# Patient Record
Sex: FEMALE | Race: BLACK OR AFRICAN AMERICAN | NOT HISPANIC OR LATINO | Employment: FULL TIME | URBAN - METROPOLITAN AREA
[De-identification: names, ages, dates, MRNs, and addresses within clinical notes are randomized per-mention and may not be internally consistent; named-entity substitution may affect disease eponyms.]

---

## 2024-01-02 ENCOUNTER — PATIENT MESSAGE (OUTPATIENT)
Dept: INTERNAL MEDICINE | Facility: CLINIC | Age: 49
End: 2024-01-02

## 2024-01-02 ENCOUNTER — TELEPHONE (OUTPATIENT)
Dept: INTERNAL MEDICINE | Facility: CLINIC | Age: 49
End: 2024-01-02

## 2024-01-02 DIAGNOSIS — R73.03 PREDIABETES: ICD-10-CM

## 2024-01-02 NOTE — TELEPHONE ENCOUNTER
Caller: Stacia Gomez    Relationship: Self    Best call back number: [phone]  Requested Prescriptions:   Requested Prescriptions     Pending Prescriptions Disp Refills    Tirzepatide (Mounjaro) 7.5 MG/0.5ML solution pen-injector pen 3 mL 1     Sig: Inject 0.5 mL under the skin into the appropriate area as directed 1 (One) Time Per Week.        Pharmacy where request should be sent: [id number] [address] AT [facility] [address]  - [phone]  - [phone] FX     Last office visit with prescribing clinician: 12/1/2023   Last telemedicine visit with prescribing clinician: Visit date not found   Next office visit with prescribing clinician: 1/23/2024     Additional details provided by patient: PT IS OUT BUT WANTS TO GO BACK DOWN TO THE 5MG INSTEAD OF STAYING AT THE 7.5. PT HAS HAD TO CANCEL APPT TODAY DUE TO PROVIDER BEING OUT AND HAS AN UPCOMING APPT ON 1/23/24 @1PM.     Does the patient have less than a 3 day supply:  [x] Yes  [] No    Would you like a call back once the refill request has been completed: [x] Yes [] No    If the office needs to give you a call back, can they leave a voicemail: [] Yes [] No    Shan Lyles Rep   01/02/24 13:07 EST

## 2024-01-02 NOTE — TELEPHONE ENCOUNTER
Can you please fill since Dr. Pinzon is out?     Last office visit with prescribing clinician: 12/1/2023   Last telemedicine visit with prescribing clinician: Visit date not found   Next office visit with prescribing clinician: 1/23/2024

## 2024-01-03 DIAGNOSIS — R73.03 PREDIABETES: Primary | ICD-10-CM

## 2024-01-03 NOTE — TELEPHONE ENCOUNTER
From: Stacia Gomez  To: Marce Pinzon  Sent: 1/2/2024 9:55 PM EST  Subject: Mounjaro Refill     I was scheduled for an appt with Dr. Pinzon today which was cancelled due to her being out sick.   My appt was rescheduled for 01/23, but my appt today was for my monthly check with Mounjaro and to get my RX called in to the pharmacy. My request was to go back down to the 5mg and I checked with the pharmacy and see the RX was called in for the 7.5mg.   I am requesting for the correct dosage for 5mg to be called in. Please let me know if you have questions.   Thanks

## 2024-01-04 ENCOUNTER — TELEPHONE (OUTPATIENT)
Dept: INTERNAL MEDICINE | Facility: CLINIC | Age: 49
End: 2024-01-04
Payer: COMMERCIAL

## 2024-01-04 ENCOUNTER — PRIOR AUTHORIZATION (OUTPATIENT)
Dept: INTERNAL MEDICINE | Facility: CLINIC | Age: 49
End: 2024-01-04
Payer: COMMERCIAL

## 2024-01-04 NOTE — TELEPHONE ENCOUNTER
----- Message from Stacia Gomez sent at 1/4/2024 12:24 PM EST -----  Regarding: Mounjaro Refill   Contact: [phone]  In addition, I received a call from [facility] stating the RX for Mounjaro needs a PA. The pharmacist said they sent the PA info to your office for further info since a PA wasn’t previously needed for the medicine.

## 2024-01-05 NOTE — TELEPHONE ENCOUNTER
PA has been denied.     Denial because:  Your plan only covers this drug when it is used for certain health conditions. Covered use is for type 2  diabetes. Your plan does not cover this drug for your health condition that your doctor told us you  have. We reviewed the information we had. Your request has been denied.

## 2024-01-10 DIAGNOSIS — E66.01 CLASS 2 SEVERE OBESITY WITH SERIOUS COMORBIDITY IN ADULT, UNSPECIFIED BMI, UNSPECIFIED OBESITY TYPE: Primary | ICD-10-CM

## 2024-01-10 RX ORDER — SEMAGLUTIDE 1 MG/.5ML
1 INJECTION, SOLUTION SUBCUTANEOUS WEEKLY
Qty: 3 ML | Refills: 1 | Status: SHIPPED | OUTPATIENT
Start: 2024-01-10 | End: 2024-01-12 | Stop reason: SDUPTHER

## 2024-01-12 ENCOUNTER — OFFICE VISIT (OUTPATIENT)
Dept: INTERNAL MEDICINE | Facility: CLINIC | Age: 49
End: 2024-01-12
Payer: COMMERCIAL

## 2024-01-12 ENCOUNTER — LAB (OUTPATIENT)
Dept: INTERNAL MEDICINE | Facility: CLINIC | Age: 49
End: 2024-01-12
Payer: COMMERCIAL

## 2024-01-12 VITALS
RESPIRATION RATE: 18 BRPM | WEIGHT: 176.8 LBS | SYSTOLIC BLOOD PRESSURE: 124 MMHG | HEART RATE: 67 BPM | BODY MASS INDEX: 32.54 KG/M2 | TEMPERATURE: 98.2 F | OXYGEN SATURATION: 100 % | HEIGHT: 62 IN | DIASTOLIC BLOOD PRESSURE: 80 MMHG

## 2024-01-12 DIAGNOSIS — R73.03 PREDIABETES: Primary | ICD-10-CM

## 2024-01-12 DIAGNOSIS — E66.01 CLASS 2 SEVERE OBESITY WITH SERIOUS COMORBIDITY IN ADULT, UNSPECIFIED BMI, UNSPECIFIED OBESITY TYPE: ICD-10-CM

## 2024-01-12 DIAGNOSIS — R91.1 LUNG NODULE: ICD-10-CM

## 2024-01-12 DIAGNOSIS — N83.209 CYST OF OVARY, UNSPECIFIED LATERALITY: ICD-10-CM

## 2024-01-12 DIAGNOSIS — M25.50 ARTHRALGIA, UNSPECIFIED JOINT: ICD-10-CM

## 2024-01-12 LAB
CRP SERPL-MCNC: 3.69 MG/DL (ref 0–0.5)
ERYTHROCYTE [SEDIMENTATION RATE] IN BLOOD: 45 MM/HR (ref 0–20)
EXPIRATION DATE: NORMAL
HBA1C MFR BLD: 5.2 % (ref 4.5–5.7)
Lab: NORMAL

## 2024-01-12 PROCEDURE — 86140 C-REACTIVE PROTEIN: CPT | Performed by: FAMILY MEDICINE

## 2024-01-12 PROCEDURE — 85652 RBC SED RATE AUTOMATED: CPT | Performed by: FAMILY MEDICINE

## 2024-01-12 RX ORDER — SEMAGLUTIDE 1 MG/.5ML
1 INJECTION, SOLUTION SUBCUTANEOUS WEEKLY
Qty: 3 ML | Refills: 1 | Status: SHIPPED | OUTPATIENT
Start: 2024-01-12

## 2024-01-12 NOTE — PROGRESS NOTES
"    Office Note     Name: Stacia Gomez    : 1975     MRN: [id number]     Chief Complaint  Follow-up (Follow up on prediabetes, Patient also says she has some results that came back for a CT scan on her chest that she would like to go over as well. )    Subjective     History of Present Illness:  Stacia Gomez is a 48 y.o. female who presents today for several concerns.  She states that her insurance will no longer pay for Monjouro and she would like to change to wegovy and has had trouble finding it at a pharmacy and was continuing to lose wt on the other medicine and her bs was going down also.  She went to the ER with abdominal pain last month and was found to have cysts on her ovaries and she is having surgery later this month to possibly have an ovary removed or the cyst removed and she was also found to have stable lung nodules.  She wants to discuss this.    Review of Systems   Respiratory:  Negative for cough, shortness of breath and wheezing.    Cardiovascular:  Negative for chest pain and palpitations.   Gastrointestinal:  Negative for blood in stool, diarrhea and vomiting.   Genitourinary:  Negative for dysuria, flank pain and genital sores.       Objective     Vital Signs  /80   Pulse 67   Temp 98.2 °F (36.8 °C) (Temporal)   Resp 18   Ht 157.5 cm (62.01\")   Wt 80.2 kg (176 lb 12.8 oz)   SpO2 100%   BMI 32.33 kg/m²   Estimated body mass index is 32.33 kg/m² as calculated from the following:    Height as of this encounter: 157.5 cm (62.01\").    Weight as of this encounter: 80.2 kg (176 lb 12.8 oz).            Physical Exam  Vitals and nursing note reviewed.   HENT:      Head: Normocephalic and atraumatic.   Neck:      Vascular: No carotid bruit.   Cardiovascular:      Rate and Rhythm: Normal rate and regular rhythm.      Heart sounds: Normal heart sounds. No murmur heard.     No gallop.   Pulmonary:      Effort: Pulmonary effort is normal. No respiratory distress.      Breath sounds: No " stridor. No wheezing, rhonchi or rales.   Musculoskeletal:      Cervical back: Normal range of motion and neck supple.      Right lower leg: No edema.      Left lower leg: No edema.   Lymphadenopathy:      Cervical: No cervical adenopathy.   Neurological:      Mental Status: She is alert.                 Assessment and Plan     Diagnoses and all orders for this visit:    1. Prediabetes (Primary)  -     POC Glycosylated Hemoglobin (Hb A1C)    2. Class 2 severe obesity with serious comorbidity in adult, unspecified BMI, unspecified obesity type--stop monjouro and start wegovy and rtc in 2 months  -     Semaglutide-Weight Management (Wegovy) 1 MG/0.5ML solution auto-injector; Inject 0.5 mL under the skin into the appropriate area as directed 1 (One) Time Per Week.  Dispense: 3 mL; Refill: 1  3.  Ovarian cysts planning surgery later this month    4.  Lung nodules-stable        Follow Up  2 months    Marce Pinzon DO

## 2024-01-15 ENCOUNTER — TELEPHONE (OUTPATIENT)
Dept: INTERNAL MEDICINE | Facility: CLINIC | Age: 49
End: 2024-01-15
Payer: COMMERCIAL

## 2024-01-15 NOTE — TELEPHONE ENCOUNTER
Spoke with patient about labs, she States she does have a rheumatologist, and she will call them to set up appt.   She verbalized understanding with no further questions

## 2024-01-15 NOTE — TELEPHONE ENCOUNTER
----- Message from Marce Pinzon DO sent at 1/15/2024  1:18 PM EST -----  Her inflammatory markers are elevated----does she have a rheumatologist that she still sees?

## 2024-02-06 ENCOUNTER — PRIOR AUTHORIZATION (OUTPATIENT)
Dept: INTERNAL MEDICINE | Facility: CLINIC | Age: 49
End: 2024-02-06
Payer: COMMERCIAL

## 2024-02-20 ENCOUNTER — TELEMEDICINE (OUTPATIENT)
Dept: INTERNAL MEDICINE | Facility: CLINIC | Age: 49
End: 2024-02-20
Payer: COMMERCIAL

## 2024-02-20 DIAGNOSIS — K58.0 IRRITABLE BOWEL SYNDROME WITH DIARRHEA: ICD-10-CM

## 2024-02-20 DIAGNOSIS — I10 PRIMARY HYPERTENSION: ICD-10-CM

## 2024-02-20 DIAGNOSIS — M79.7 FIBROMYALGIA: ICD-10-CM

## 2024-02-20 DIAGNOSIS — R73.03 PREDIABETES: ICD-10-CM

## 2024-02-20 DIAGNOSIS — E66.9 CLASS 1 OBESITY WITH SERIOUS COMORBIDITY AND BODY MASS INDEX (BMI) OF 31.0 TO 31.9 IN ADULT, UNSPECIFIED OBESITY TYPE: Primary | ICD-10-CM

## 2024-02-20 PROCEDURE — 99213 OFFICE O/P EST LOW 20 MIN: CPT | Performed by: FAMILY MEDICINE

## 2024-02-20 RX ORDER — SEMAGLUTIDE 1.7 MG/.75ML
0.75 INJECTION, SOLUTION SUBCUTANEOUS WEEKLY
Qty: 3 ML | Refills: 2 | Status: SHIPPED | OUTPATIENT
Start: 2024-02-20

## 2024-02-20 NOTE — PROGRESS NOTES
Telehealth Visit     Date: 2024   Patient Name: Stacia Gomez  : 1975   MRN: [id number]     Chief Complaint:  No chief complaint on file.      This provider is located at the [facility] in [place], [place]. The patient is being seen remotely via telehealth at their home address in [place], and stated they are in a secure environment for this session. The patient's condition being diagnosed/treated is appropriate for telemedicine. The provider identified herself as well as her credentials. The patient, and/or patients guardian, consent to be seen remotely, and when consent is given they understand that the consent allows for patient identifiable information to be sent to a third party as needed. They may refuse to be seen remotely at any time. The electronic data is encrypted and password protected, and the patient and/or guardian has been advised of the potential risks to privacy not withstanding such measures.    You have chosen to receive care through a telehealth visit. Do you consent to use a video/audio connection for your medical care today? Yes    History of Present Illness: Stacia Gomez is a 48 y.o. female who is here today to follow up with several concerns.  She has been using Wegovy for weight loss.  She also recently had surgery and they removed her fallopian tubes because they were enlarged and inflamed and they removed a large cyst from one of her ovaries.  She has been recovering from the surgery and doing well.  Her last BMI was 32.33 and her weight was 176 pounds.  Today her weight is 170 pounds and her BMI has gone down to 31 and she has been successful with her weight loss with the medication Wegovy.  We are going to go up to the next dose of Wegovy at this point because she is tolerating it well and doing well and being successful with it.  Her other chronic issues are stable.      Subjective      Review of Systems:   Review of Systems   HENT:   Negative for ear discharge, ear pain and facial swelling.    Respiratory:  Negative for cough, choking and chest tightness.    Cardiovascular:  Negative for chest pain, palpitations and leg swelling.       I have reviewed and the following portions of the patient's history were updated as appropriate: past family history, past medical history, past social history, past surgical history and problem list.    Medications:     Current Outpatient Medications:     buPROPion XL (WELLBUTRIN XL) 300 MG 24 hr tablet, Take 1 tablet by mouth Daily., Disp: 90 tablet, Rfl: 1    famotidine (PEPCID) 20 MG tablet, Take 1 tablet by mouth Every 12 (Twelve) Hours., Disp: , Rfl:     fexofenadine (ALLEGRA) 60 MG tablet, Take 1 tablet by mouth Daily., Disp: , Rfl:     hydroCHLOROthiazide (HYDRODIURIL) 25 MG tablet, Take 1 tablet by mouth Daily., Disp: 90 tablet, Rfl: 1    metoprolol tartrate (LOPRESSOR) 25 MG tablet, Take 1 tablet by mouth 2 (Two) Times a Day., Disp: 180 tablet, Rfl: 1    Naltrexone powder, Use 1 Units Daily. Naltrexone 100% powder  Compounded 2mg low dose naltrexone compounded lactose free 2mg, 1 po qd Dispense quantity 30 gram with 3 refills, Disp: 30 g, Rfl: 4    promethazine (PHENERGAN) 25 MG tablet, Take 1 tablet by mouth Every 6 (Six) Hours As Needed for Nausea or Vomiting., Disp: 40 tablet, Rfl: 4    Rimegepant Sulfate (Nurtec) 75 MG tablet dispersible tablet, Take 1 tablet by mouth., Disp: , Rfl:     Semaglutide-Weight Management (Wegovy) 1.7 MG/0.75ML solution auto-injector, Inject 0.75 mL under the skin into the appropriate area as directed 1 (One) Time Per Week., Disp: 3 mL, Rfl: 2    traZODone (DESYREL) 50 MG tablet, Take 1-2 tablets by mouth every night at bedtime., Disp: , Rfl:     vitamin D (ERGOCALCIFEROL) 1.25 MG (40704 UT) capsule capsule, Take 1 capsule by mouth 1 (One) Time Per Week., Disp: 12 capsule, Rfl: 1    Allergies:   Allergies   Allergen Reactions    Dog Epithelium Allergy Skin Test Hives and  Itching    Prunus Persica Hives and Itching    Shellfish Allergy Anaphylaxis     Coughing, hives, carry Epipen    Alimentum Hives    Lemon Oil Hives    Mangifera Indica Hives    Pineapple Hives    Tomato Hives    Amoxicillin Hives    Peanut Oil Itching    Ascorbate Rash    Strawberry Itching and Rash       Objective     Physical Exam:  Vital Signs: There were no vitals filed for this visit.  There is no height or weight on file to calculate BMI.    Physical Exam  Constitutional:       Appearance: Normal appearance.   HENT:      Head: Normocephalic and atraumatic.      Nose: Nose normal.   Pulmonary:      Effort: Pulmonary effort is normal.   Neurological:      Mental Status: She is alert.         Assessment / Plan      Assessment/Plan:   Diagnoses and all orders for this visit:    1. Class 1 obesity with serious comorbidity and body mass index (BMI) of 31.0 to 31.9 in adult, unspecified obesity type (Primary)  -     Semaglutide-Weight Management (Wegovy) 1.7 MG/0.75ML solution auto-injector; Inject 0.75 mL under the skin into the appropriate area as directed 1 (One) Time Per Week.  Dispense: 3 mL; Refill: 2    2. Primary hypertension    3. Fibromyalgia    4. Irritable bowel syndrome with diarrhea    5. Prediabetes         Follow Up:   1 month    Any medications prescribed have been sent electronically to   [facility] #[id number] - [place], [place] - [address] [facility] AT [address] [facility] & [address] - [phone] PH - [phone] FX  [address] [facility] [address] [facility] [address]  Phone: [phone] Fax: [phone]    [facility] #[id number] - [place], [place] - [address] - [phone] PH - [phone] FX  [address] [facility] [address]  Phone: [phone] Fax: [phone]    [facility] - [place], [place] - [address] [place] [address] - [phone] PH - [phone] FX  [address] [facility] [address]  [facility] [address]  Phone: [phone] Fax:  [phone]    [place] [address]  Phone: [phone] Fax: [phone]        Marce Pinzon, DO

## 2024-02-26 DIAGNOSIS — R00.2 PALPITATIONS: ICD-10-CM

## 2024-02-26 DIAGNOSIS — E55.9 VITAMIN D DEFICIENCY: ICD-10-CM

## 2024-02-26 DIAGNOSIS — H81.09 MENIERE'S DISEASE, UNSPECIFIED LATERALITY: ICD-10-CM

## 2024-02-26 DIAGNOSIS — F41.1 GAD (GENERALIZED ANXIETY DISORDER): ICD-10-CM

## 2024-02-26 DIAGNOSIS — I10 PRIMARY HYPERTENSION: ICD-10-CM

## 2024-02-26 RX ORDER — ERGOCALCIFEROL 1.25 MG/1
50000 CAPSULE ORAL WEEKLY
Qty: 12 CAPSULE | Refills: 0 | Status: SHIPPED | OUTPATIENT
Start: 2024-02-26

## 2024-02-26 RX ORDER — HYDROCHLOROTHIAZIDE 25 MG/1
25 TABLET ORAL DAILY
Qty: 90 TABLET | Refills: 0 | Status: SHIPPED | OUTPATIENT
Start: 2024-02-26

## 2024-02-26 RX ORDER — BUPROPION HYDROCHLORIDE 300 MG/1
300 TABLET ORAL DAILY
Qty: 90 TABLET | Refills: 0 | Status: SHIPPED | OUTPATIENT
Start: 2024-02-26

## 2024-02-26 NOTE — TELEPHONE ENCOUNTER
Sending in 90d supply of chronic meds, she has appt with Dr Pinzon coming up, please call to reschedule this with new PCP

## 2024-02-27 ENCOUNTER — TELEPHONE (OUTPATIENT)
Dept: INTERNAL MEDICINE | Facility: CLINIC | Age: 49
End: 2024-02-27
Payer: COMMERCIAL

## 2024-02-27 NOTE — TELEPHONE ENCOUNTER
Patient is requesting that PPWK be resubmitted for her prior auth for Wegovy.  Patient stated that she had spoken with insurance and it had not been rec'd.  Per [employer] message Patricia stated that PPWk had been faxed.

## 2024-03-12 ENCOUNTER — OFFICE VISIT (OUTPATIENT)
Dept: INTERNAL MEDICINE | Facility: CLINIC | Age: 49
End: 2024-03-12
Payer: COMMERCIAL

## 2024-03-12 ENCOUNTER — PATIENT MESSAGE (OUTPATIENT)
Dept: INTERNAL MEDICINE | Facility: CLINIC | Age: 49
End: 2024-03-12

## 2024-03-12 VITALS
BODY MASS INDEX: 31.36 KG/M2 | RESPIRATION RATE: 20 BRPM | DIASTOLIC BLOOD PRESSURE: 68 MMHG | HEART RATE: 72 BPM | WEIGHT: 170.4 LBS | HEIGHT: 62 IN | OXYGEN SATURATION: 96 % | SYSTOLIC BLOOD PRESSURE: 118 MMHG | TEMPERATURE: 97.8 F

## 2024-03-12 DIAGNOSIS — T36.95XA ANTIBIOTIC-INDUCED YEAST INFECTION: ICD-10-CM

## 2024-03-12 DIAGNOSIS — K44.9 HIATAL HERNIA: ICD-10-CM

## 2024-03-12 DIAGNOSIS — E66.9 OBESITY (BMI 30.0-34.9): ICD-10-CM

## 2024-03-12 DIAGNOSIS — R05.1 ACUTE COUGH: Primary | ICD-10-CM

## 2024-03-12 DIAGNOSIS — R00.2 PALPITATIONS: ICD-10-CM

## 2024-03-12 DIAGNOSIS — G43.109 MIGRAINE WITH AURA AND WITHOUT STATUS MIGRAINOSUS, NOT INTRACTABLE: ICD-10-CM

## 2024-03-12 DIAGNOSIS — F51.01 PRIMARY INSOMNIA: ICD-10-CM

## 2024-03-12 DIAGNOSIS — B37.9 ANTIBIOTIC-INDUCED YEAST INFECTION: ICD-10-CM

## 2024-03-12 DIAGNOSIS — H81.09 MENIERE'S DISEASE, UNSPECIFIED LATERALITY: ICD-10-CM

## 2024-03-12 DIAGNOSIS — F41.1 GAD (GENERALIZED ANXIETY DISORDER): ICD-10-CM

## 2024-03-12 DIAGNOSIS — J22 ACUTE RESPIRATORY INFECTION: ICD-10-CM

## 2024-03-12 DIAGNOSIS — I10 PRIMARY HYPERTENSION: ICD-10-CM

## 2024-03-12 LAB
EXPIRATION DATE: NORMAL
FLUAV AG UPPER RESP QL IA.RAPID: NOT DETECTED
FLUBV AG UPPER RESP QL IA.RAPID: NOT DETECTED
INTERNAL CONTROL: NORMAL
Lab: NORMAL
SARS-COV-2 AG UPPER RESP QL IA.RAPID: NOT DETECTED

## 2024-03-12 RX ORDER — FLUCONAZOLE 150 MG/1
150 TABLET ORAL ONCE
Qty: 2 TABLET | Refills: 0 | Status: SHIPPED | OUTPATIENT
Start: 2024-03-12 | End: 2024-03-12

## 2024-03-12 RX ORDER — AZITHROMYCIN 250 MG/1
TABLET, FILM COATED ORAL
Qty: 6 TABLET | Refills: 0 | Status: SHIPPED | OUTPATIENT
Start: 2024-03-12

## 2024-03-12 RX ORDER — EPINEPHRINE 0.3 MG/.3ML
INJECTION SUBCUTANEOUS
COMMUNITY
Start: 2024-03-11

## 2024-03-12 RX ORDER — BROMPHENIRAMINE MALEATE, PSEUDOEPHEDRINE HYDROCHLORIDE, AND DEXTROMETHORPHAN HYDROBROMIDE 2; 30; 10 MG/5ML; MG/5ML; MG/5ML
SYRUP ORAL
COMMUNITY
Start: 2024-03-07

## 2024-03-12 RX ORDER — ALBUTEROL SULFATE 90 UG/1
2 AEROSOL, METERED RESPIRATORY (INHALATION) EVERY 4 HOURS PRN
Qty: 18 G | Refills: 1 | Status: SHIPPED | OUTPATIENT
Start: 2024-03-12

## 2024-03-12 NOTE — PROGRESS NOTES
"Subjective   Stacia Gomez is a 48 y.o. female who is here to be evaluated for a cough.    Chief Complaint   Patient presents with    Cough     Started yesterday, some wheezing        PCP: Cheryl Velasquez APRN    History of Present Illness     Her cough started on 03/10/2024. It is a productive cough with yellowish mucus. She works from home and has not been around anyone who is sick. She went out Saturday, 03/09/2024 to run some errands and went to [employer] on Sunday. Her cough worsened yesterday and today is the worst she has felt with head and chest congestion. She had a fever last night of 101 degrees which she thought was due to night sweats. Her temperature this morning was normal. Her ears feel \"achy and scratchy\". She denies a sore throat, but it is irritated due to her cough. She had a left ovarian cyst removal and tubal ligation in 01/2024, and she feels pain near her incision when she coughs. She denies sinus pain or pressure. She has tried brompheniramine cough syrup with pseudoephedrine. She has taken Tylenol and Phenergan for nausea. She started using her inhaler yesterday. She is a non-smoker and denies using tobacco products. She had a history of asthma as a child. She has a history of allergies and takes allergy injections.    She has been on Wellbutrin for 10 years for anxiety, which works well for her.     She takes hydrochlorothiazide for Meniere's disease, which helps with her symptoms.     She has been having intermittent tinnitus consistently since 07/2023. She saw an ENT, but they did not see anything.     She started experiencing palpitations after she had COVID-19 in 2020. She takes metoprolol and tolerates it well. She saw a cardiologist, Dr. Kwon, and everything was normal. She had a CT scan in 01/2024 ordered by Dr. Sabas Shelton. 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 Follows there routinely    She takes trazodone 50 mg 2 tablets at night for insomnia, which was prescribed by " Dr. Pinzon.     The patient takes Nurtec for migraines, which works well. She has not had a migraine since Fall 2023. She denies any auras with her migraines. Her neurologist is Dr. Bautista.     She has a hiatal hernia and takes famotidine for any reflux symptoms. She follows Dr. Yumi Davis. She typically takes over-the-counter Nexium instead of Pepcid.     The patient takes a vitamin D supplement weekly.     She has constant  hip pain and takes naltrexone which is effective, and she tolerates it well. Between her previous PCP and her rheumatologist, she went through several different medications including Celebrex and gabapentin. At that time, she was having weight issues and was already taking bupropion. Her PCP told her these medications together sometimes help with weight loss, but it did not. It did help her fibromyalgia though.     Her weight has fluctuated most of her adult life, Her maximum weight was 204 pounds. She took Mounjaro in the past and had success with it but could not get coverage because she does not have diabetes. She had success with Wegovy 1 mg in 01/2024, but she is currently having prior authorization issues. She denied any GI side effects. She reports going from Mounjaro to Wegovy was ok. She has been off Wegovy for 1 month. She tried phentermine years ago, but it made her sick and gave her acne. She has tried portion control, eliminating bread and fried foods, and decreasing carbohydrates. She does intermittent fasting in the morning. She has a fruit-based smoothie with water or ice for breakfast. For lunch, she has a kale salad with protein. What she eats for dinner depends on how late she works. She has tried dietary efforts and weight loss attempts for longer than 6 months. She tries to do 15 to 30 minutes of exercise by walking her dog. She saw a nutritionist in the past.  She plans on continuing weight loss efforts through increasing exercise and following healthy balanced diet.      She requests a prescription for Diflucan due to a yeast infection.      She is allergic to AMOXICILLIN.    She takes Wellbutrin, metoprolol, trazodone 50 mg, Nurtec, vitamin D, famotidine, Nexium, and naltrexone.     The following portions of the patient's history were reviewed and updated as appropriate: allergies, current medications, past family history, past medical history, past social history, past surgical history and problem list.        Review of Systems   Constitutional:  Negative for fatigue, fever and unexpected weight loss.   HENT:  Positive for congestion, ear pain and tinnitus.    Eyes:  Negative for blurred vision, double vision and visual disturbance.   Respiratory:  Positive for cough. Negative for shortness of breath and wheezing.    Cardiovascular:  Positive for palpitations. Negative for chest pain and leg swelling.   Gastrointestinal:  Negative for abdominal pain, constipation, diarrhea, nausea and vomiting.   Genitourinary:  Negative for difficulty urinating, frequency and urgency.   Musculoskeletal:  Positive for arthralgias. Negative for myalgias.   Skin:  Negative for color change and rash.   Neurological:  Negative for dizziness, weakness and headache.   Hematological:  Negative for adenopathy. Does not bruise/bleed easily.   Psychiatric/Behavioral:  Positive for sleep disturbance.            Outpatient Medications Marked as Taking for the 3/12/24 encounter (Office Visit) with Cheryl Velasquez APRN   Medication Sig Dispense Refill    brompheniramine-pseudoephedrine-DM 30-2-10 MG/5ML syrup       buPROPion XL (WELLBUTRIN XL) 300 MG 24 hr tablet Take 1 tablet by mouth Daily. 90 tablet 0    EPINEPHrine (EPIPEN) 0.3 MG/0.3ML solution auto-injector injection       fexofenadine (ALLEGRA) 60 MG tablet Take 1 tablet by mouth Daily.      hydroCHLOROthiazide 25 MG tablet Take 1 tablet by mouth Daily. 90 tablet 0    metoprolol tartrate (LOPRESSOR) 25 MG tablet Take 1 tablet by mouth 2 (Two) Times  a Day. 180 tablet 0    Naltrexone powder Use 1 Units Daily. Naltrexone 100% powder   Compounded  2mg low dose naltrexone compounded lactose free  2mg, 1 po qd  Dispense quantity 30 gram with 3 refills 30 g 4    promethazine (PHENERGAN) 25 MG tablet Take 1 tablet by mouth Every 6 (Six) Hours As Needed for Nausea or Vomiting. 40 tablet 4    Rimegepant Sulfate (Nurtec) 75 MG tablet dispersible tablet Take 1 tablet by mouth.      traZODone (DESYREL) 50 MG tablet Take 1-2 tablets by mouth every night at bedtime.      vitamin D (ERGOCALCIFEROL) 1.25 MG (32789 UT) capsule capsule Take 1 capsule by mouth 1 (One) Time Per Week. 12 capsule 0     Allergies   Allergen Reactions    Dog Epithelium Allergy Skin Test Hives and Itching    Prunus Persica Hives and Itching    Shellfish Allergy Anaphylaxis     Coughing, hives, carry Epipen    Alimentum Hives    Lemon Oil Hives    Mangifera Indica Hives    Pineapple Hives    Tomato Hives    Amoxicillin Hives    Peanut Oil Itching    Ascorbate Rash    Strawberry Itching and Rash           Objective   Physical Exam  Constitutional:       Appearance: Normal appearance. She is well-developed. She is obese.   HENT:      Head: Normocephalic and atraumatic.   Eyes:      General: No scleral icterus.     Conjunctiva/sclera: Conjunctivae normal.   Cardiovascular:      Rate and Rhythm: Normal rate and regular rhythm.      Heart sounds: Normal heart sounds.   Pulmonary:      Effort: Pulmonary effort is normal. No respiratory distress.      Breath sounds: Wheezing present.      Comments: Mild wheezing noted.   Abdominal:      General: Bowel sounds are normal.      Palpations: Abdomen is soft.      Tenderness: There is no abdominal tenderness.   Musculoskeletal:         General: Normal range of motion.      Cervical back: Normal range of motion.      Right lower leg: No edema.      Left lower leg: No edema.   Skin:     General: Skin is warm and dry.   Neurological:      General: No focal deficit  "present.      Mental Status: She is alert and oriented to person, place, and time.   Psychiatric:         Attention and Perception: Attention normal.         Mood and Affect: Mood and affect normal.         Behavior: Behavior normal. Behavior is cooperative.         Thought Content: Thought content normal.         Cognition and Memory: Cognition normal.         Judgment: Judgment normal.         Vitals:    03/12/24 1341   BP: 118/68   BP Location: Left arm   Patient Position: Sitting   Cuff Size: Adult   Pulse: 72   Resp: 20   Temp: 97.8 °F (36.6 °C)   TempSrc: Infrared   SpO2: 96%   Weight: 77.3 kg (170 lb 6.4 oz)   Height: 157.5 cm (62.01\")     Body mass index is 31.16 kg/m².  Wt Readings from Last 3 Encounters:   03/12/24 77.3 kg (170 lb 6.4 oz)   01/12/24 80.2 kg (176 lb 12.8 oz)   12/08/23 83.5 kg (184 lb)       Results for orders placed or performed in visit on 03/12/24   POCT SARS-CoV-2 Antigen TL + Flu    Specimen: Swab   Result Value Ref Range    SARS Antigen Not Detected Not Detected, Presumptive Negative    Influenza A Antigen TL Not Detected Not Detected    Influenza B Antigen TL Not Detected Not Detected    Internal Control Passed Passed    Lot Number [id number]     Expiration Date 12/04/2024            Assessment & Plan   Diagnoses and all orders for this visit:    1. Acute cough (Primary)  -     POCT SARS-CoV-2 Antigen TL + Flu    2. KEVIN (generalized anxiety disorder)    3. Primary hypertension    4. Meniere's disease, unspecified laterality    5. Palpitations    6. Primary insomnia    7. Migraine with aura and without status migrainosus, not intractable    8. Hiatal hernia    9. Acute respiratory infection  -     azithromycin (Zithromax Z-Malcolm) 250 MG tablet; Take 2 tablets the first day, then 1 tablet daily for 4 days.  Dispense: 6 tablet; Refill: 0  -     albuterol sulfate  (90 Base) MCG/ACT inhaler; Inhale 2 puffs Every 4 (Four) Hours As Needed for Wheezing.  Dispense: 18 g; Refill: " 1    10. Antibiotic-induced yeast infection  -     fluconazole (Diflucan) 150 MG tablet; Take 1 tablet by mouth 1 (One) Time for 1 dose. May repeat in 3 days if needed  Dispense: 2 tablet; Refill: 0    11. Obesity (BMI 30.0-34.9)  -     Semaglutide-Weight Management 1 MG/0.5ML solution auto-injector; Inject 0.5 mL under the skin into the appropriate area as directed Every 7 (Seven) Days.  Dispense: 2 mL; Refill: 0      1. Cough.  Her COVID-19 and influenza test were negative. I suspect she may have a respiratory infection. I will prescribe Zithromax. She will continue the cough medication. I will refill her albuterol inhaler.  A work note was provided today.       2. Yeast infection - typically gets after antibiotics.  I will prescribe Diflucan 2 tablets.     3. Weight management.  I will restart her on wegovy at 1 mg. We did review the side effects and contraindications.   She will continue high protein, low carb diet and minimum 150 min exercise a week.      4.  Hypertension/Ménière's  Well-controlled continue hydrochlorothiazide     5 palpitations  Well-controlled continue metoprolol    6.  Migraines   stable continue as needed Nurtec      Return in about 4 weeks (around 4/9/2024) for Recheck.      I spent 41 minutes caring for Stacia on this date of service. This time includes time spent by me in the following activities:preparing for the visit, reviewing tests, performing a medically appropriate examination and/or evaluation , counseling and educating the patient/family/caregiver, ordering medications, tests, or procedures, documenting information in the medical record, and care coordination  I discussed my findings,recommendations, and plan of care was with the patient. They verbalized understanding and agreement.  Patient was encouraged to keep me informed of any acute changes, lack of improvement, or any new concerning symptoms.     Transcribed from ambient dictation for RIP Tillman by Jackie  Jeromy.  03/12/24   16:17 EDT    Patient or patient representative verbalized consent to the visit recording.  I have personally performed the services described in this document as transcribed by the above individual, and it is both accurate and complete.  Cheryl Velasquez, APRN  3/13/2024  16:59 EDT

## 2024-03-13 NOTE — TELEPHONE ENCOUNTER
From: Stacia Gomez  To: Cheryl Velasquez  Sent: 3/12/2024 3:57 PM EDT  Subject: Thank You!     I wanted to let you know how pleased I am to establish care with you. I appreciate you taking me as a new patient during this transition.   Your attentiveness and display of patient care was very impressive today. I appreciate you being thorough and detail specific when it came to certain questions and your responses.     Today’s encounter shows there are still good clinicians out there who have a desire to make favorable impressions on their patients and care about patient satisfaction.     Again, thank you so much. I was a pleasure to meet you today!!!!     Thanks,   Stacia

## 2024-03-14 ENCOUNTER — TELEPHONE (OUTPATIENT)
Dept: INTERNAL MEDICINE | Facility: CLINIC | Age: 49
End: 2024-03-14
Payer: COMMERCIAL

## 2024-03-14 DIAGNOSIS — E66.9 OBESITY (BMI 30.0-34.9): ICD-10-CM

## 2024-03-14 NOTE — TELEPHONE ENCOUNTER
----- Message from Stacia Gomez sent at 3/13/2024  5:07 PM EDT -----  Regarding: Thank You!   Contact: [phone]  Thank you so much! I previously had the RX for Wegovy sent to [facility] due to the shortage and [facility] didn’t have it. It would be much appreciated if can be sent to [facility] instead.   Thanks again!!!!

## 2024-04-12 ENCOUNTER — OFFICE VISIT (OUTPATIENT)
Dept: INTERNAL MEDICINE | Facility: CLINIC | Age: 49
End: 2024-04-12
Payer: COMMERCIAL

## 2024-04-12 VITALS
WEIGHT: 168 LBS | HEART RATE: 76 BPM | SYSTOLIC BLOOD PRESSURE: 116 MMHG | TEMPERATURE: 97.1 F | RESPIRATION RATE: 18 BRPM | BODY MASS INDEX: 30.91 KG/M2 | DIASTOLIC BLOOD PRESSURE: 80 MMHG | HEIGHT: 62 IN

## 2024-04-12 DIAGNOSIS — R79.82 CRP ELEVATED: ICD-10-CM

## 2024-04-12 DIAGNOSIS — M25.50 MULTIPLE JOINT PAIN: ICD-10-CM

## 2024-04-12 DIAGNOSIS — R70.0 ELEVATED SED RATE: Primary | ICD-10-CM

## 2024-04-12 DIAGNOSIS — M25.50 ARTHRALGIA, UNSPECIFIED JOINT: ICD-10-CM

## 2024-04-12 PROCEDURE — 99214 OFFICE O/P EST MOD 30 MIN: CPT | Performed by: NURSE PRACTITIONER

## 2024-04-12 RX ORDER — SEMAGLUTIDE 1.7 MG/.75ML
1.7 INJECTION, SOLUTION SUBCUTANEOUS
Qty: 3 ML | Refills: 1 | Status: SHIPPED | OUTPATIENT
Start: 2024-04-12

## 2024-04-12 RX ORDER — NALTREXONE 100 %
1 POWDER (GRAM) MISCELLANEOUS DAILY
Qty: 30 G | Refills: 4 | Status: CANCELLED | OUTPATIENT
Start: 2024-04-12

## 2024-04-12 NOTE — PROGRESS NOTES
Subjective   Stacia Gomez is a 48 y.o. female.     Chief Complaint   Patient presents with    Obesity       PCP: Cheryl Velasquez APRN    History of Present Illness /ROS    History of Present Illness  The patient presents for evaluation of multiple medical concerns.    The patient has been on a regimen of Wegovy 1 mg for the past 3 weeks, which she tolerates well, albeit with occasional xerostomia. She experienced nausea during the initial week of Wegovy administration, which has since resolved. Mild constipation has been noted, however, she has refrained from using MiraLAX in her smoothies or coffee for the past 2 weeks and plans to resume its use. Over the past 2 weeks, she has experienced unusual cravings, often consuming doughnuts and pizza, which induce mild discomfort. She also reports severe abdominal pain, which she attributes to her history of gastrointestinal issues. Despite previously experiencing cravings while on Wegovy, these cravings have become more frequent, typically occurring later in the week. Her energy levels are satisfactory. Her last dose of Wegovy was on Sunday, and she began experiencing cravings on Wednesday. She maintains a diet rich in protein, including meat, broccoli, protein-rich vegetables, and bananas. Her physical activity is limited due to weather conditions.    It has been 4 months since her surgery,(tubal/ovarian cyst removal) and she wishes to have her anti-inflammatory labs checked. She reports experiencing joint and muscle pain, which she attributes to weather conditions. She has been on naltrexone for approximately 2 years, prescribed by her previous primary care physician for fibromyalgia, and anticipates running out of this medication soon.    Her migraines have recurred this week. She has samples of Ubrelvy provided by her neurologist, Dr. Bautista, which have been effective.    Results  Laboratory Studies  CRP was 3.69. Sed rate was 45. A1c in January was 5.2.    The  following portions of the patient's history were reviewed and updated as appropriate: allergies, current medications, past family history, past medical history, past social history, past surgical history and problem list.          Outpatient Medications Marked as Taking for the 4/12/24 encounter (Office Visit) with Cheryl Velasquez APRN   Medication Sig Dispense Refill    albuterol sulfate  (90 Base) MCG/ACT inhaler Inhale 2 puffs Every 4 (Four) Hours As Needed for Wheezing. 18 g 1    brompheniramine-pseudoephedrine-DM 30-2-10 MG/5ML syrup       buPROPion XL (WELLBUTRIN XL) 300 MG 24 hr tablet Take 1 tablet by mouth Daily. 90 tablet 0    EPINEPHrine (EPIPEN) 0.3 MG/0.3ML solution auto-injector injection       esomeprazole (nexIUM) 20 MG capsule Take 1 capsule by mouth Daily As Needed.      fexofenadine (ALLEGRA) 60 MG tablet Take 1 tablet by mouth Daily.      hydroCHLOROthiazide 25 MG tablet Take 1 tablet by mouth Daily. 90 tablet 0    metoprolol tartrate (LOPRESSOR) 25 MG tablet Take 1 tablet by mouth 2 (Two) Times a Day. 180 tablet 0    Naltrexone powder Use 1 Units Daily. Naltrexone 100% powder   Compounded  2mg low dose naltrexone compounded lactose free  2mg, 1 po qd  Dispense quantity 30 gram with 3 refills 30 g 4    promethazine (PHENERGAN) 25 MG tablet Take 1 tablet by mouth Every 6 (Six) Hours As Needed for Nausea or Vomiting. 40 tablet 4    Rimegepant Sulfate (Nurtec) 75 MG tablet dispersible tablet Take 1 tablet by mouth.      traZODone (DESYREL) 50 MG tablet Take 1-2 tablets by mouth every night at bedtime.      vitamin D (ERGOCALCIFEROL) 1.25 MG (54367 UT) capsule capsule Take 1 capsule by mouth 1 (One) Time Per Week. 12 capsule 0    [DISCONTINUED] Semaglutide-Weight Management 1 MG/0.5ML solution auto-injector Inject 0.5 mL under the skin into the appropriate area as directed Every 7 (Seven) Days. 2 mL 0     Allergies   Allergen Reactions    Dog Epithelium (Canis Lupus Familiaris) Hives and  Itching    Prunus Persica Hives and Itching    Shellfish Allergy Anaphylaxis     Coughing, hives, carry Epipen    Alimentum Hives    Lemon Oil Hives    Mangifera Indica Hives    Pineapple Hives    Tomato Hives    Amoxicillin Hives    Peanut Oil Itching    Ascorbate Rash    Strawberry Itching and Rash       Objective   Physical Exam  Constitutional:       Appearance: Normal appearance. She is well-developed.   HENT:      Head: Normocephalic and atraumatic.   Eyes:      General: No scleral icterus.     Conjunctiva/sclera: Conjunctivae normal.   Cardiovascular:      Rate and Rhythm: Normal rate and regular rhythm.      Heart sounds: Normal heart sounds.   Pulmonary:      Effort: Pulmonary effort is normal. No respiratory distress.      Breath sounds: Normal breath sounds.   Abdominal:      General: Bowel sounds are normal.      Palpations: Abdomen is soft.      Tenderness: There is no abdominal tenderness.   Musculoskeletal:         General: Normal range of motion.      Cervical back: Normal range of motion.      Right lower leg: No edema.      Left lower leg: No edema.   Skin:     General: Skin is warm and dry.   Neurological:      General: No focal deficit present.      Mental Status: She is alert and oriented to person, place, and time.   Psychiatric:         Attention and Perception: Attention normal.         Mood and Affect: Mood and affect normal.         Behavior: Behavior normal. Behavior is cooperative.         Thought Content: Thought content normal.         Cognition and Memory: Cognition normal.         Judgment: Judgment normal.         Patient is alert and oriented, in no distress, well developed.  No scleral icterus in the eyes.  No carotid bruits in the neck. Thyroid feels normal. No masses or enlargement in the neck.  Respiratory effort is normal.  Heart has a regular rate. No murmurs detected.  Abdomen is soft and nontender except for mild tenderness to the right side. No masses or rebound tenderness.  "Normal active bowel sounds in all 4 quadrants of the abdomen.  Skin is warm and dry.  Patient's mood and behavior are normal.  Vitals:    04/12/24 1442   BP: 116/80   BP Location: Right arm   Patient Position: Sitting   Cuff Size: Adult   Pulse: 76   Resp: 18   Temp: 97.1 °F (36.2 °C)   TempSrc: Infrared   SpO2: Comment: unable to read   Weight: 76.2 kg (168 lb)   Height: 157.5 cm (62.01\")     Body mass index is 30.72 kg/m².  Wt Readings from Last 3 Encounters:   04/12/24 76.2 kg (168 lb)   03/12/24 77.3 kg (170 lb 6.4 oz)   01/12/24 80.2 kg (176 lb 12.8 oz)             Assessment & Plan   Diagnoses and all orders for this visit:    1. Elevated sed rate (Primary)  -     C-reactive protein; Future  -     Sedimentation Rate; Future  -     CBC (No Diff); Future  -     Comprehensive Metabolic Panel; Future    2. CRP elevated  -     C-reactive protein; Future  -     Sedimentation Rate; Future  -     CBC (No Diff); Future  -     Comprehensive Metabolic Panel; Future    3. Multiple joint pain  -     C-reactive protein; Future  -     Sedimentation Rate; Future  -     CBC (No Diff); Future  -     Comprehensive Metabolic Panel; Future    4. Arthralgia, unspecified joint    Other orders  -     Semaglutide-Weight Management (Wegovy) 1.7 MG/0.75ML solution auto-injector; Inject 0.75 mL under the skin into the appropriate area as directed Every 7 (Seven) Days.  Dispense: 3 mL; Refill: 1        Assessment & Plan  1. Obesity.  The patient has experienced a weight loss of 2.6.4 ounces since her last visit. A prescription for Wegovy 1.7 mg, to be taken once weekly for a duration of 4 weeks, has been issued. The patient has been advised to maintain adequate hydration, abstain from sodas, tea, and coffee, and to monitor her diet as she experiences increased appetite suppression. A minimum of 150 minutes of moderate intensity exercise weekly has been recommended.    2. Elevated anti-inflammatory levels.  A CBC, CMP, CRP, sed rate, " kidney function, liver function, and blood count will be conducted today.    3. Fibromyalgia.  The patient's joint pain and muscle pain may be contributing to her condition. A prescription for naltrexone has been issued.    Follow-up  The patient is scheduled for a follow-up visit in 3 weeks.            Return in about 3 weeks (around 5/3/2024) for Recheck.    I discussed my findings,recommendations, and plan of care was with the patient. They verbalized understanding and agreement.  Patient was encouraged to keep me informed of any acute changes, lack of improvement, or any new concerning symptoms.     Patient or patient representative verbalized consent for the use of Ambient Listening during the visit with  RIP Tillman for chart documentation. 4/24/2024  16:27 EDT

## 2024-04-28 ENCOUNTER — HOSPITAL ENCOUNTER (EMERGENCY)
Facility: HOSPITAL | Age: 49
Discharge: HOME | End: 2024-04-29
Attending: FAMILY MEDICINE | Admitting: FAMILY MEDICINE
Payer: COMMERCIAL

## 2024-04-28 DIAGNOSIS — M54.41 ACUTE BACK PAIN WITH SCIATICA, RIGHT: ICD-10-CM

## 2024-04-28 DIAGNOSIS — S70.11XA CONTUSION OF RIGHT THIGH, INITIAL ENCOUNTER: Primary | ICD-10-CM

## 2024-04-28 PROCEDURE — 99283 EMERGENCY DEPT VISIT LOW MDM: CPT

## 2024-04-29 ENCOUNTER — APPOINTMENT (OUTPATIENT)
Facility: HOSPITAL | Age: 49
End: 2024-04-29
Payer: COMMERCIAL

## 2024-04-29 VITALS
OXYGEN SATURATION: 100 % | WEIGHT: 166 LBS | BODY MASS INDEX: 30.55 KG/M2 | RESPIRATION RATE: 18 BRPM | TEMPERATURE: 97.8 F | HEIGHT: 62 IN | DIASTOLIC BLOOD PRESSURE: 74 MMHG | HEART RATE: 61 BPM | SYSTOLIC BLOOD PRESSURE: 119 MMHG

## 2024-04-29 PROCEDURE — 73552 X-RAY EXAM OF FEMUR 2/>: CPT

## 2024-04-29 PROCEDURE — 73502 X-RAY EXAM HIP UNI 2-3 VIEWS: CPT

## 2024-04-29 PROCEDURE — 25010000002 KETOROLAC TROMETHAMINE PER 15 MG: Performed by: FAMILY MEDICINE

## 2024-04-29 PROCEDURE — 96372 THER/PROPH/DIAG INJ SC/IM: CPT

## 2024-04-29 PROCEDURE — 25010000002 METHYLPREDNISOLONE PER 125 MG: Performed by: FAMILY MEDICINE

## 2024-04-29 RX ORDER — KETOROLAC TROMETHAMINE 30 MG/ML
30 INJECTION, SOLUTION INTRAMUSCULAR; INTRAVENOUS ONCE
Status: DISCONTINUED | OUTPATIENT
Start: 2024-04-29 | End: 2024-04-29

## 2024-04-29 RX ORDER — KETOROLAC TROMETHAMINE 15 MG/ML
15 INJECTION, SOLUTION INTRAMUSCULAR; INTRAVENOUS ONCE
Status: DISCONTINUED | OUTPATIENT
Start: 2024-04-29 | End: 2024-04-29

## 2024-04-29 RX ORDER — LIDOCAINE 4 G/G
1 PATCH TOPICAL
Status: DISCONTINUED | OUTPATIENT
Start: 2024-04-29 | End: 2024-04-29 | Stop reason: HOSPADM

## 2024-04-29 RX ORDER — CYCLOBENZAPRINE HCL 10 MG
10 TABLET ORAL ONCE
Status: DISCONTINUED | OUTPATIENT
Start: 2024-04-29 | End: 2024-04-29

## 2024-04-29 RX ORDER — METHYLPREDNISOLONE SODIUM SUCCINATE 125 MG/2ML
125 INJECTION, POWDER, LYOPHILIZED, FOR SOLUTION INTRAMUSCULAR; INTRAVENOUS ONCE
Status: COMPLETED | OUTPATIENT
Start: 2024-04-29 | End: 2024-04-29

## 2024-04-29 RX ORDER — DIAZEPAM 5 MG/1
10 TABLET ORAL ONCE
Status: COMPLETED | OUTPATIENT
Start: 2024-04-29 | End: 2024-04-29

## 2024-04-29 RX ADMIN — DIAZEPAM 10 MG: 5 TABLET ORAL at 01:26

## 2024-04-29 RX ADMIN — METHYLPREDNISOLONE SODIUM SUCCINATE 125 MG: 125 INJECTION INTRAMUSCULAR; INTRAVENOUS at 00:37

## 2024-04-29 RX ADMIN — LIDOCAINE PAIN RELIEF 1 PATCH: 560 PATCH TOPICAL at 01:26

## 2024-04-29 NOTE — FSED PROVIDER NOTE
Subjective  History of Present Illness:    48-year-old female with medical history concerning for fibromyalgia, headache and anxiety presents today for evaluation of right lower extremity pain after injury earlier today.  Patient reports that several hours ago she ran into a table corner and hurt her right thigh.  Since that time she has had worsening leg pain that is radiating up into her buttocks and down the leg with numbness and tingling of the foot.  Later on the evening she began having a headache.  Patient reports that she is concerned that she had a blood clot that went to the brain.  Pain is alleviated by not moving and worsened by bending and lifting her leg.      Nurses Notes reviewed and agree, including vitals, allergies, social history and prior medical history.     REVIEW OF SYSTEMS: All systems reviewed and not pertinent unless noted.  Review of Systems   Constitutional:  Negative for chills, fatigue and fever.   HENT:  Negative for ear pain, rhinorrhea, sinus pain and sore throat.    Respiratory:  Negative for cough and shortness of breath.    Cardiovascular:  Negative for chest pain and palpitations.   Gastrointestinal:  Negative for diarrhea, nausea and vomiting.   Musculoskeletal:  Positive for arthralgias, back pain and myalgias.   Neurological:  Positive for numbness. Negative for syncope, weakness, light-headedness and headaches.   All other systems reviewed and are negative.      Past Medical History:   Diagnosis Date    Anxiety     Asthma     as a child    Fibromyalgia, primary     GERD (gastroesophageal reflux disease)     Headache     History of medical problems     Meineires Disease    HL (hearing loss) 08/22    Inflammatory bowel disease     Palpitation        Allergies:    Dog epithelium (canis lupus familiaris), Prunus persica, Shellfish allergy, Alimentum, Lemon oil, Mangifera indica, Pineapple, Tomato, Toradol [ketorolac tromethamine], Amoxicillin, Peanut oil, Ascorbate, and  "Strawberry      Past Surgical History:   Procedure Laterality Date    CHOLECYSTECTOMY  09/2016    COLONOSCOPY  01/2023    ENDOMETRIAL ABLATION      HYSTERECTOMY  01/2014    OVARIAN CYST REMOVAL Left 01/29/2024    TUBAL ABDOMINAL LIGATION  07/1999    TUBAL ABDOMINAL LIGATION Left 01/29/2024         Social History     Socioeconomic History    Marital status:    Tobacco Use    Smoking status: Never     Passive exposure: Never    Smokeless tobacco: Never   Vaping Use    Vaping status: Never Used   Substance and Sexual Activity    Alcohol use: Yes     Alcohol/week: 3.0 standard drinks of alcohol     Types: 1 Glasses of wine, 1 Shots of liquor, 1 Drinks containing 0.5 oz of alcohol per week    Drug use: Never    Sexual activity: Yes     Partners: Male     Birth control/protection: Tubal ligation, Hysterectomy         Family History   Problem Relation Age of Onset    Arthritis Mother     Hyperlipidemia Mother     Vision loss Mother     Other Father         Amyloidosis    Heart disease Maternal Grandmother     Cancer Sister         Breast cancee       Objective  Physical Exam:  /70   Pulse 59   Temp 97.8 °F (36.6 °C) (Oral)   Resp 18   Ht 157.5 cm (62\")   Wt 75.3 kg (166 lb)   SpO2 100%   BMI 30.36 kg/m²      Physical Exam  Vitals and nursing note reviewed.   Constitutional:       Appearance: Normal appearance. She is normal weight.   HENT:      Head: Normocephalic and atraumatic.      Right Ear: Tympanic membrane, ear canal and external ear normal.      Left Ear: Tympanic membrane, ear canal and external ear normal.      Nose: Nose normal.      Mouth/Throat:      Mouth: Mucous membranes are moist.      Pharynx: Oropharynx is clear.   Eyes:      Extraocular Movements: Extraocular movements intact.      Conjunctiva/sclera: Conjunctivae normal.      Pupils: Pupils are equal, round, and reactive to light.   Cardiovascular:      Rate and Rhythm: Normal rate and regular rhythm.      Pulses: Normal pulses.     "  Heart sounds: Normal heart sounds.   Pulmonary:      Effort: Pulmonary effort is normal.      Breath sounds: Normal breath sounds.   Abdominal:      General: Abdomen is flat. Bowel sounds are normal.      Palpations: Abdomen is soft.   Musculoskeletal:         General: Normal range of motion.      Cervical back: Normal range of motion and neck supple.      Lumbar back: Tenderness present. Positive right straight leg raise test.        Back:       Right upper leg: Tenderness present.        Legs:    Skin:     General: Skin is warm.      Capillary Refill: Capillary refill takes less than 2 seconds.   Neurological:      General: No focal deficit present.      Mental Status: She is alert and oriented to person, place, and time. Mental status is at baseline.   Psychiatric:         Mood and Affect: Mood normal.         Behavior: Behavior normal.         Thought Content: Thought content normal.         Judgment: Judgment normal.         Musculoskeletal Ultrasound    Date/Time: 4/29/2024 1:25 AM    Performed by: Loyd Hedrick MD  Authorized by: Loyd Hedrick MD    Comments:      Bedside ultrasound venous duplex of the right lower extremity for evaluation of leg pain.  Images of the common femoral, superficial femoral and popliteal veins.  All compressible.  No blood clots noted.  Images permanently saved to patient record      ED Course:         Lab Results (last 24 hours)       ** No results found for the last 24 hours. **             XR Hip With or Without Pelvis 2 - 3 View Right    Result Date: 4/29/2024  XR HIP W OR WO PELVIS 2-3 VIEW RIGHT Date of Exam: 4/29/2024 12:25 AM EDT Indication: pain Comparison: None available. Findings: There is no evidence of fracture or dislocation. No focal lesions identified. No erosions. No periostitis. No focal soft tissue abnormalities identified.     Impression: Impression: Normal Electronically Signed: Satinder Cochran MD  4/29/2024 12:49 AM EDT  Workstation ID: [id number]    XR  Femur 2 View Right    Result Date: 4/29/2024  XR FEMUR 2 VW RIGHT Date of Exam: 4/29/2024 12:25 AM EDT Indication: pain Comparison: None available. Findings: There is no evidence of fracture or dislocation. No focal lesions identified. No erosions. No periostitis. No focal soft tissue abnormalities identified.     Impression: Impression: Normal Electronically Signed: Satinder Cochran MD  4/29/2024 12:49 AM EDT  Workstation ID: [id number]        [facility]      Initial impression of presenting illness: 48-year-old female presenting today for evaluation of right-sided forehead pain as well as headache.    DDX: includes but is not limited to: Contusion, hematoma fracture    Patient arrives normotensive, afebrile with normal heart rate and SpO2 99% room air with vitals interpreted by myself.     Pertinent features from physical exam: Tenderness palpation of the entire right thigh and hip.  Positive straight leg test on the right.    Initial diagnostic plan: Imaging of the hip and thigh    Results from initial plan were reviewed and interpreted by me revealing normal hip and thigh    Diagnostic information from other sources:      Interventions / Re-evaluation: Toradol, steroids, Flexeril with improvement of pain and relief of headache    Medications   Lidocaine 4 % 1 patch (1 patch Transdermal Medication Applied 4/29/24 0126)   methylPREDNISolone sodium succinate (SOLU-Medrol) injection 125 mg (125 mg Intramuscular Given 4/29/24 0037)   diazePAM (VALIUM) tablet 10 mg (10 mg Oral Given 4/29/24 0126)       Results/clinical rationale were discussed with patient and     Consultations/Discussion of results with other physicians: None    Data interpreted: Nursing notes reviewed, vital signs reviewed.  Imaging independently interpreted by me (x-ray).  O2 saturation: 99% room air    Counseling: Discussed the results above with the patient regarding need for admission or discharge.  Patient understands and agrees plan of care.       -----  ED Disposition       ED Disposition   Discharge    Condition   Stable    Comment   --             Final diagnoses:   Contusion of right thigh, initial encounter   Acute back pain with sciatica, right      Your Follow-Up Providers       Cheryl Velasquez APRN. Schedule an appointment as soon as possible for a visit in 3 days.    Specialties: Nurse Practitioner, Family Medicine  Follow up details: If symptoms worsen, For further evaluation  [address] [facility]  [address]  [place] [id number] [phone]                       Contact information for after-discharge care    Follow-up information has not been specified.                    Your medication list        CONTINUE taking these medications        Instructions Last Dose Given Next Dose Due   albuterol sulfate  (90 Base) MCG/ACT inhaler  Commonly known as: PROVENTIL HFA;VENTOLIN HFA;PROAIR HFA      Inhale 2 puffs Every 4 (Four) Hours As Needed for Wheezing.       brompheniramine-pseudoephedrine-DM 30-2-10 MG/5ML syrup           buPROPion  MG 24 hr tablet  Commonly known as: WELLBUTRIN XL      Take 1 tablet by mouth Daily.       EPINEPHrine 0.3 MG/0.3ML solution auto-injector injection  Commonly known as: EPIPEN           esomeprazole 20 MG capsule  Commonly known as: nexIUM      Take 1 capsule by mouth Daily As Needed.       famotidine 20 MG tablet  Commonly known as: PEPCID      Take 1 tablet by mouth Every 12 (Twelve) Hours.       fexofenadine 60 MG tablet  Commonly known as: ALLEGRA      Take 1 tablet by mouth Daily.       hydroCHLOROthiazide 25 MG tablet      Take 1 tablet by mouth Daily.       metoprolol tartrate 25 MG tablet  Commonly known as: LOPRESSOR      Take 1 tablet by mouth 2 (Two) Times a Day.       Naltrexone powder      Use 1 Units Daily. Naltrexone 100% powder   Compounded  2mg low dose naltrexone compounded lactose free  2mg, 1 po qd  Dispense quantity 30 gram with 3 refills       Nurtec 75 MG dispersible  tablet  Generic drug: Rimegepant Sulfate      Take 1 tablet by mouth.       promethazine 25 MG tablet  Commonly known as: PHENERGAN      Take 1 tablet by mouth Every 6 (Six) Hours As Needed for Nausea or Vomiting.       traZODone 50 MG tablet  Commonly known as: DESYREL      Take 1-2 tablets by mouth every night at bedtime.       vitamin D 1.25 MG (68407 UT) capsule capsule  Commonly known as: ERGOCALCIFEROL      Take 1 capsule by mouth 1 (One) Time Per Week.       Wegovy 1.7 MG/0.75ML solution auto-injector  Generic drug: Semaglutide-Weight Management      Inject 0.75 mL under the skin into the appropriate area as directed Every 7 (Seven) Days.

## 2024-05-02 DIAGNOSIS — E55.9 VITAMIN D DEFICIENCY: ICD-10-CM

## 2024-05-02 RX ORDER — ERGOCALCIFEROL 1.25 MG/1
50000 CAPSULE ORAL WEEKLY
Qty: 12 CAPSULE | Refills: 0 | Status: CANCELLED | OUTPATIENT
Start: 2024-05-02

## 2024-05-03 ENCOUNTER — OFFICE VISIT (OUTPATIENT)
Dept: INTERNAL MEDICINE | Facility: CLINIC | Age: 49
End: 2024-05-03
Payer: COMMERCIAL

## 2024-05-03 VITALS
HEART RATE: 72 BPM | BODY MASS INDEX: 30.8 KG/M2 | HEIGHT: 62 IN | OXYGEN SATURATION: 97 % | WEIGHT: 167.4 LBS | SYSTOLIC BLOOD PRESSURE: 106 MMHG | TEMPERATURE: 97.5 F | DIASTOLIC BLOOD PRESSURE: 78 MMHG | RESPIRATION RATE: 16 BRPM

## 2024-05-03 DIAGNOSIS — E55.9 VITAMIN D DEFICIENCY: Primary | ICD-10-CM

## 2024-05-03 DIAGNOSIS — M79.7 FIBROMYALGIA: ICD-10-CM

## 2024-05-03 DIAGNOSIS — E66.9 OBESITY (BMI 30.0-34.9): ICD-10-CM

## 2024-05-03 DIAGNOSIS — M54.41 ACUTE MIDLINE LOW BACK PAIN WITH RIGHT-SIDED SCIATICA: ICD-10-CM

## 2024-05-03 DIAGNOSIS — M25.50 ARTHRALGIA, UNSPECIFIED JOINT: ICD-10-CM

## 2024-05-03 PROCEDURE — 99214 OFFICE O/P EST MOD 30 MIN: CPT | Performed by: NURSE PRACTITIONER

## 2024-05-03 RX ORDER — ERGOCALCIFEROL 1.25 MG/1
50000 CAPSULE ORAL WEEKLY
Qty: 12 CAPSULE | Refills: 0 | Status: SHIPPED | OUTPATIENT
Start: 2024-05-03

## 2024-05-03 RX ORDER — NALTREXONE 100 %
1 POWDER (GRAM) MISCELLANEOUS DAILY
Qty: 30 G | Refills: 4 | Status: SHIPPED | OUTPATIENT
Start: 2024-05-03

## 2024-05-03 RX ORDER — BACLOFEN 10 MG/1
10 TABLET ORAL 3 TIMES DAILY
Qty: 30 TABLET | Refills: 0 | Status: SHIPPED | OUTPATIENT
Start: 2024-05-03 | End: 2024-05-06 | Stop reason: SDUPTHER

## 2024-05-06 ENCOUNTER — TELEPHONE (OUTPATIENT)
Dept: INTERNAL MEDICINE | Facility: CLINIC | Age: 49
End: 2024-05-06
Payer: COMMERCIAL

## 2024-05-06 DIAGNOSIS — M54.41 ACUTE MIDLINE LOW BACK PAIN WITH RIGHT-SIDED SCIATICA: ICD-10-CM

## 2024-05-06 RX ORDER — BACLOFEN 10 MG/1
10 TABLET ORAL 3 TIMES DAILY
Qty: 30 TABLET | Refills: 0 | Status: SHIPPED | OUTPATIENT
Start: 2024-05-06

## 2024-05-06 RX ORDER — TRAZODONE HYDROCHLORIDE 50 MG/1
50-100 TABLET ORAL
OUTPATIENT
Start: 2024-05-06

## 2024-05-07 ENCOUNTER — PATIENT MESSAGE (OUTPATIENT)
Dept: INTERNAL MEDICINE | Facility: CLINIC | Age: 49
End: 2024-05-07
Payer: COMMERCIAL

## 2024-05-08 RX ORDER — TRAZODONE HYDROCHLORIDE 50 MG/1
50-100 TABLET ORAL
Qty: 60 TABLET | Refills: 1 | Status: SHIPPED | OUTPATIENT
Start: 2024-05-08

## 2024-05-10 ENCOUNTER — HOSPITAL ENCOUNTER (EMERGENCY)
Facility: HOSPITAL | Age: 49
Discharge: HOME | End: 2024-05-11
Attending: EMERGENCY MEDICINE
Payer: COMMERCIAL

## 2024-05-10 ENCOUNTER — HOSPITAL ENCOUNTER (OUTPATIENT)
Dept: GENERAL RADIOLOGY | Facility: HOSPITAL | Age: 49
Discharge: HOME | End: 2024-05-10
Admitting: NURSE PRACTITIONER
Payer: COMMERCIAL

## 2024-05-10 ENCOUNTER — APPOINTMENT (OUTPATIENT)
Dept: CT IMAGING | Facility: HOSPITAL | Age: 49
End: 2024-05-10
Payer: COMMERCIAL

## 2024-05-10 DIAGNOSIS — M54.41 ACUTE MIDLINE LOW BACK PAIN WITH RIGHT-SIDED SCIATICA: ICD-10-CM

## 2024-05-10 DIAGNOSIS — G43.009 MIGRAINE WITHOUT AURA AND WITHOUT STATUS MIGRAINOSUS, NOT INTRACTABLE: Primary | ICD-10-CM

## 2024-05-10 PROCEDURE — 72100 X-RAY EXAM L-S SPINE 2/3 VWS: CPT

## 2024-05-10 PROCEDURE — 25010000002 MAGNESIUM SULFATE 2 GM/50ML SOLUTION: Performed by: EMERGENCY MEDICINE

## 2024-05-10 PROCEDURE — 99284 EMERGENCY DEPT VISIT MOD MDM: CPT

## 2024-05-10 PROCEDURE — 96365 THER/PROPH/DIAG IV INF INIT: CPT

## 2024-05-10 PROCEDURE — 25010000002 METOCLOPRAMIDE PER 10 MG: Performed by: EMERGENCY MEDICINE

## 2024-05-10 PROCEDURE — 96375 TX/PRO/DX INJ NEW DRUG ADDON: CPT

## 2024-05-10 PROCEDURE — 25010000002 MORPHINE PER 10 MG: Performed by: EMERGENCY MEDICINE

## 2024-05-10 PROCEDURE — 25810000003 SODIUM CHLORIDE 0.9 % SOLUTION: Performed by: EMERGENCY MEDICINE

## 2024-05-10 PROCEDURE — 72220 X-RAY EXAM SACRUM TAILBONE: CPT

## 2024-05-10 PROCEDURE — 70450 CT HEAD/BRAIN W/O DYE: CPT

## 2024-05-10 PROCEDURE — 25010000002 DIPHENHYDRAMINE PER 50 MG: Performed by: EMERGENCY MEDICINE

## 2024-05-10 RX ORDER — MORPHINE SULFATE 4 MG/ML
4 INJECTION, SOLUTION INTRAMUSCULAR; INTRAVENOUS ONCE
Status: COMPLETED | OUTPATIENT
Start: 2024-05-10 | End: 2024-05-10

## 2024-05-10 RX ORDER — DIPHENHYDRAMINE HYDROCHLORIDE 50 MG/ML
25 INJECTION INTRAMUSCULAR; INTRAVENOUS ONCE
Status: COMPLETED | OUTPATIENT
Start: 2024-05-10 | End: 2024-05-10

## 2024-05-10 RX ORDER — SODIUM CHLORIDE 0.9 % (FLUSH) 0.9 %
10 SYRINGE (ML) INJECTION AS NEEDED
Status: DISCONTINUED | OUTPATIENT
Start: 2024-05-10 | End: 2024-05-11 | Stop reason: HOSPADM

## 2024-05-10 RX ORDER — METOCLOPRAMIDE HYDROCHLORIDE 5 MG/ML
10 INJECTION INTRAMUSCULAR; INTRAVENOUS ONCE
Status: COMPLETED | OUTPATIENT
Start: 2024-05-10 | End: 2024-05-10

## 2024-05-10 RX ORDER — MAGNESIUM SULFATE HEPTAHYDRATE 40 MG/ML
2 INJECTION, SOLUTION INTRAVENOUS ONCE
Status: COMPLETED | OUTPATIENT
Start: 2024-05-10 | End: 2024-05-11

## 2024-05-10 RX ADMIN — METOCLOPRAMIDE 10 MG: 5 INJECTION, SOLUTION INTRAMUSCULAR; INTRAVENOUS at 22:07

## 2024-05-10 RX ADMIN — SODIUM CHLORIDE 1000 ML: 9 INJECTION, SOLUTION INTRAVENOUS at 22:08

## 2024-05-10 RX ADMIN — DIPHENHYDRAMINE HYDROCHLORIDE 25 MG: 50 INJECTION INTRAMUSCULAR; INTRAVENOUS at 22:07

## 2024-05-10 RX ADMIN — MAGNESIUM SULFATE HEPTAHYDRATE 2 G: 2 INJECTION, SOLUTION INTRAVENOUS at 23:44

## 2024-05-10 RX ADMIN — MORPHINE SULFATE 4 MG: 4 INJECTION, SOLUTION INTRAMUSCULAR; INTRAVENOUS at 22:08

## 2024-05-11 VITALS
OXYGEN SATURATION: 97 % | BODY MASS INDEX: 30 KG/M2 | SYSTOLIC BLOOD PRESSURE: 121 MMHG | RESPIRATION RATE: 16 BRPM | HEIGHT: 62 IN | TEMPERATURE: 98.8 F | DIASTOLIC BLOOD PRESSURE: 78 MMHG | HEART RATE: 74 BPM | WEIGHT: 163 LBS

## 2024-05-11 PROCEDURE — 96367 TX/PROPH/DG ADDL SEQ IV INF: CPT

## 2024-05-11 RX ADMIN — VALPROATE SODIUM 1000 MG: 100 INJECTION, SOLUTION INTRAVENOUS at 00:21

## 2024-05-11 NOTE — DISCHARGE INSTRUCTIONS
Call your neurologist to schedule a follow-up appointment.  Return to the ER as needed for new or worsening symptoms.  Continue taking your prescribed headache medications as needed.

## 2024-05-11 NOTE — ED PROVIDER NOTES
Subjective   History of Present Illness  Patient presents for evaluation of headache.  Patient states that she has a history of migraine headaches.  She has had a few headaches this week and today's was more severe than usual.  It is right-sided occipital and radiates throughout the head.  She has had some nausea and vomiting.  No neck stiffness no fevers or chills.  No recent head trauma.  She states she did have a fall about a month ago.    History provided by:  Patient      Review of Systems    Past Medical History:   Diagnosis Date    Anxiety     Asthma     as a child    Fibromyalgia, primary     GERD (gastroesophageal reflux disease)     Headache     History of medical problems     Meineires Disease    HL (hearing loss) 08/22    Inflammatory bowel disease     Palpitation        Allergies   Allergen Reactions    Dog Epithelium (Canis Lupus Familiaris) Hives and Itching    Prunus Persica Hives and Itching    Shellfish Allergy Anaphylaxis     Coughing, hives, carry Epipen    Alimentum Hives    Lemon Oil Hives    Mangifera Indica Hives    Pineapple Hives    Tomato Hives    Toradol [Ketorolac Tromethamine] Unknown - High Severity     Previous reaction with anesthesia    Amoxicillin Hives    Peanut Oil Itching    Ascorbate Rash    Strawberry Itching and Rash       Past Surgical History:   Procedure Laterality Date    CHOLECYSTECTOMY  09/2016    COLONOSCOPY  01/2023    ENDOMETRIAL ABLATION      HYSTERECTOMY  01/2014    OVARIAN CYST REMOVAL Left 01/29/2024    TUBAL ABDOMINAL LIGATION  07/1999    TUBAL ABDOMINAL LIGATION Left 01/29/2024       Family History   Problem Relation Age of Onset    Arthritis Mother     Hyperlipidemia Mother     Vision loss Mother     Other Father         Amyloidosis    Heart disease Maternal Grandmother     Cancer Sister         Breast cancee       Social History     Socioeconomic History    Marital status:    Tobacco Use    Smoking status: Never     Passive exposure: Never    Smokeless  tobacco: Never   Vaping Use    Vaping status: Never Used   Substance and Sexual Activity    Alcohol use: Yes     Alcohol/week: 3.0 standard drinks of alcohol     Types: 1 Glasses of wine, 1 Shots of liquor, 1 Drinks containing 0.5 oz of alcohol per week    Drug use: Never    Sexual activity: Yes     Partners: Male     Birth control/protection: Tubal ligation, Hysterectomy           Objective   Physical Exam  Constitutional:       General: She is not in acute distress.  HENT:      Head: Normocephalic and atraumatic.   Eyes:      Conjunctiva/sclera: Conjunctivae normal.      Pupils: Pupils are equal, round, and reactive to light.   Cardiovascular:      Rate and Rhythm: Normal rate and regular rhythm.      Pulses: Normal pulses.      Heart sounds: No murmur heard.     No gallop.   Pulmonary:      Effort: Pulmonary effort is normal. No respiratory distress.   Abdominal:      General: Abdomen is flat. There is no distension.      Tenderness: There is no abdominal tenderness.   Musculoskeletal:         General: No swelling or deformity. Normal range of motion.      Cervical back: Normal range of motion and neck supple. No rigidity or tenderness.   Skin:     General: Skin is warm and dry.      Capillary Refill: Capillary refill takes less than 2 seconds.   Neurological:      General: No focal deficit present.      Mental Status: She is alert and oriented to person, place, and time.      Comments: GCS 15.  Cranial Nerves II-XII intact without deficit.  Strength 5/5 in the bilateral upper extremities.  Strength 5/5 in the bilateral lower extremities.  Sensation to light touch intact throughout.  Cerebellar function intact via finger-nose-finger.     Psychiatric:         Mood and Affect: Mood normal.         Behavior: Behavior normal.         Procedures           ED Course  ED Course as of 05/11/24 0336   Fri May 10, 2024   3663 CT scan of the brain independently interpreted by myself demonstrates no acute intracranial  abnormality [KB]      ED Course User Index  [KB] Chintan Lopez MD                                             Medical Decision Making  Differential diagnosis includes migraine headache, tension headache, occipital neuralgia, emergent pathology including intracranial bleeding, tumor, venous sinus thrombosis are considered but felt to be less likely.  Patient overall well-appearing with normal vital signs and normal neurologic examination.  Patient specifically requesting CT scan of the brain as she states this would be very reassuring to her.    Patient was reassessed on multiple occasions.  She does require multiple medications including morphine, Reglan, Benadryl, valproate and magnesium but does have substantial improvement in her headache.  She feels comfortable going home at this time.  She will follow-up with her neurologist.  She was discharged from the ER in good condition    Problems Addressed:  Migraine without aura and without status migrainosus, not intractable: complicated acute illness or injury    Amount and/or Complexity of Data Reviewed  Independent Historian:      Details: Some details of HPI provided by the patient's spouse  External Data Reviewed: radiology.     Details: 2/22/2022 reviewed most recent MRI which showed some nonspecific white matter changes  Radiology: ordered and independent interpretation performed. Decision-making details documented in ED Course.    Risk  Prescription drug management.  Parenteral controlled substances.  Drug therapy requiring intensive monitoring for toxicity.  Decision regarding hospitalization.        Final diagnoses:   Migraine without aura and without status migrainosus, not intractable       ED Disposition  ED Disposition       ED Disposition   Discharge    Condition   Stable    Comment   --           No results found for this or any previous visit (from the past 24 hour(s)).  Note: In addition to lab results from this visit, the labs listed above may  include labs taken at another facility or during a different encounter within the last 24 hours. Please correlate lab times with ED admission and discharge times for further clarification of the services performed during this visit.    CT Head Without Contrast   Final Result   Impression:   No acute intracranial process evident.            Electronically Signed: Miguel A Vizcaino MD     5/10/2024 10:48 PM EDT     Workstation ID: [id number]        Vitals:    05/10/24 2330 05/11/24 0030 05/11/24 0100 05/11/24 0130   BP: 103/58 105/62 115/77 121/78   BP Location:       Patient Position:       Pulse: 65 62 67 74   Resp:       Temp:       TempSrc:       SpO2: 98% 95% 96% 97%   Weight:       Height:         Medications   sodium chloride 0.9 % flush 10 mL (has no administration in time range)   Morphine sulfate (PF) injection 4 mg (4 mg Intravenous Given 5/10/24 2208)   metoclopramide (REGLAN) injection 10 mg (10 mg Intravenous Given 5/10/24 2207)   diphenhydrAMINE (BENADRYL) injection 25 mg (25 mg Intravenous Given 5/10/24 2207)   sodium chloride 0.9 % bolus 1,000 mL (0 mL Intravenous Stopped 5/11/24 0019)   magnesium sulfate 2g/50 mL (PREMIX) infusion (0 g Intravenous Stopped 5/11/24 0025)   valproate (DEPACON) 1,000 mg in sodium chloride 0.9 % 100 mL IVPB (0 mg Intravenous Stopped 5/11/24 0148)     ECG/EMG Results (last 24 hours)       ** No results found for the last 24 hours. **          No orders to display           No follow-up provider specified.       Medication List      No changes were made to your prescriptions during this visit.            Chintan Lopez MD  05/11/24 1295

## 2024-05-17 ENCOUNTER — TELEPHONE (OUTPATIENT)
Dept: INTERNAL MEDICINE | Facility: CLINIC | Age: 49
End: 2024-05-17
Payer: COMMERCIAL

## 2024-05-17 NOTE — TELEPHONE ENCOUNTER
Your XR does not show any acute issues/fractures.   Written by RIP Buenrostro on 5/14/2024  8:01 AM EDT       Pt notified of results. Verbalized good understanding.

## 2024-05-30 DIAGNOSIS — R00.2 PALPITATIONS: ICD-10-CM

## 2024-05-31 DIAGNOSIS — E55.9 VITAMIN D DEFICIENCY: ICD-10-CM

## 2024-05-31 DIAGNOSIS — F41.1 GAD (GENERALIZED ANXIETY DISORDER): ICD-10-CM

## 2024-05-31 RX ORDER — ERGOCALCIFEROL 1.25 MG/1
50000 CAPSULE ORAL WEEKLY
Qty: 12 CAPSULE | Refills: 0 | OUTPATIENT
Start: 2024-05-31

## 2024-05-31 RX ORDER — BUPROPION HYDROCHLORIDE 300 MG/1
300 TABLET ORAL DAILY
Qty: 90 TABLET | Refills: 0 | Status: SHIPPED | OUTPATIENT
Start: 2024-05-31

## 2024-08-06 DIAGNOSIS — E55.9 VITAMIN D DEFICIENCY: ICD-10-CM

## 2024-08-06 DIAGNOSIS — M25.50 ARTHRALGIA, UNSPECIFIED JOINT: ICD-10-CM

## 2024-08-06 DIAGNOSIS — I10 PRIMARY HYPERTENSION: ICD-10-CM

## 2024-08-06 DIAGNOSIS — H81.09 MENIERE'S DISEASE, UNSPECIFIED LATERALITY: ICD-10-CM

## 2024-08-06 DIAGNOSIS — F41.1 GAD (GENERALIZED ANXIETY DISORDER): ICD-10-CM

## 2024-08-09 RX ORDER — ERGOCALCIFEROL 1.25 MG/1
50000 CAPSULE ORAL WEEKLY
Qty: 12 CAPSULE | Refills: 0 | OUTPATIENT
Start: 2024-08-09

## 2024-08-09 NOTE — TELEPHONE ENCOUNTER
Rx Refill Note  Requested Prescriptions     Pending Prescriptions Disp Refills    fexofenadine (ALLEGRA) 60 MG tablet       Sig: Take 1 tablet by mouth Daily.    hydroCHLOROthiazide 25 MG tablet 90 tablet 0     Sig: Take 1 tablet by mouth Daily.    buPROPion XL (WELLBUTRIN XL) 300 MG 24 hr tablet 90 tablet 0     Sig: Take 1 tablet by mouth Daily.    Naltrexone powder 30 g 4     Sig: Use 1 Units Daily. Naltrexone 100% powder   Compounded  2mg low dose naltrexone compounded lactose free  2mg, 1 po qd  Dispense quantity 30 gram with 3 refills     Refused Prescriptions Disp Refills    vitamin D (ERGOCALCIFEROL) 1.25 MG (04418 UT) capsule capsule 12 capsule 0     Sig: Take 1 capsule by mouth 1 (One) Time Per Week.     Refused By: DEBBI CARRANZA     Reason for Refusal: Refill not appropriate      Last office visit with prescribing clinician: 5/3/2024   Last telemedicine visit with prescribing clinician: 2/20/2024   Next office visit with prescribing clinician: 8/26/2024       Debbi Carranza MA  08/09/24, 15:00 EDT

## 2024-08-12 DIAGNOSIS — H81.09 MENIERE'S DISEASE, UNSPECIFIED LATERALITY: ICD-10-CM

## 2024-08-12 DIAGNOSIS — I10 PRIMARY HYPERTENSION: ICD-10-CM

## 2024-08-12 RX ORDER — NALTREXONE 100 %
1 POWDER (GRAM) MISCELLANEOUS DAILY
Qty: 30 G | Refills: 4 | OUTPATIENT
Start: 2024-08-12

## 2024-08-12 RX ORDER — FEXOFENADINE HCL 60 MG/1
60 TABLET, FILM COATED ORAL DAILY
Qty: 30 TABLET | Refills: 0 | Status: SHIPPED | OUTPATIENT
Start: 2024-08-12

## 2024-08-12 RX ORDER — BUPROPION HYDROCHLORIDE 300 MG/1
300 TABLET ORAL DAILY
Qty: 30 TABLET | Refills: 0 | Status: SHIPPED | OUTPATIENT
Start: 2024-08-12

## 2024-08-12 RX ORDER — HYDROCHLOROTHIAZIDE 25 MG/1
25 TABLET ORAL DAILY
Qty: 30 TABLET | Refills: 0 | Status: SHIPPED | OUTPATIENT
Start: 2024-08-12

## 2024-08-12 NOTE — TELEPHONE ENCOUNTER
Rx Refill Note  Requested Prescriptions     Pending Prescriptions Disp Refills    hydroCHLOROthiazide 25 MG tablet [Pharmacy Med Name: HYDROCHLOROTHIAZIDE 25MG TABLETS] 90 tablet      Sig: TAKE 1 TABLET BY MOUTH DAILY      Last office visit with prescribing clinician: 5/3/2024   Last telemedicine visit with prescribing clinician: 2/20/2024   Next office visit with prescribing clinician: 8/26/2024       Patricia Lamb MA  08/12/24, 17:30 EDT

## 2024-08-13 RX ORDER — HYDROCHLOROTHIAZIDE 25 MG/1
25 TABLET ORAL DAILY
Qty: 90 TABLET | OUTPATIENT
Start: 2024-08-13

## 2024-08-26 ENCOUNTER — OFFICE VISIT (OUTPATIENT)
Dept: INTERNAL MEDICINE | Facility: CLINIC | Age: 49
End: 2024-08-26
Payer: COMMERCIAL

## 2024-08-26 VITALS
SYSTOLIC BLOOD PRESSURE: 116 MMHG | HEART RATE: 64 BPM | TEMPERATURE: 97.1 F | HEIGHT: 62 IN | DIASTOLIC BLOOD PRESSURE: 72 MMHG | WEIGHT: 156.8 LBS | RESPIRATION RATE: 18 BRPM | BODY MASS INDEX: 28.85 KG/M2

## 2024-08-26 DIAGNOSIS — E66.3 OVERWEIGHT (BMI 25.0-29.9): Primary | ICD-10-CM

## 2024-08-26 DIAGNOSIS — I10 PRIMARY HYPERTENSION: ICD-10-CM

## 2024-08-26 DIAGNOSIS — R00.2 PALPITATIONS: ICD-10-CM

## 2024-08-26 DIAGNOSIS — M79.7 FIBROMYALGIA: ICD-10-CM

## 2024-08-26 PROCEDURE — 99214 OFFICE O/P EST MOD 30 MIN: CPT | Performed by: NURSE PRACTITIONER

## 2024-08-26 RX ORDER — FAMOTIDINE 20 MG/1
1 TABLET, FILM COATED ORAL DAILY
COMMUNITY
Start: 2024-07-11

## 2024-08-26 RX ORDER — UBROGEPANT 100 MG/1
100 TABLET ORAL 2 TIMES DAILY PRN
COMMUNITY

## 2024-08-26 RX ORDER — SEMAGLUTIDE 1.7 MG/.75ML
1.7 INJECTION, SOLUTION SUBCUTANEOUS
Qty: 3 ML | Refills: 3 | Status: SHIPPED | OUTPATIENT
Start: 2024-08-26

## 2024-08-27 ENCOUNTER — LAB (OUTPATIENT)
Facility: HOSPITAL | Age: 49
End: 2024-08-27
Payer: COMMERCIAL

## 2024-08-27 DIAGNOSIS — M25.50 MULTIPLE JOINT PAIN: ICD-10-CM

## 2024-08-27 DIAGNOSIS — E66.9 OBESITY (BMI 30.0-34.9): ICD-10-CM

## 2024-08-27 DIAGNOSIS — R79.82 CRP ELEVATED: ICD-10-CM

## 2024-08-27 DIAGNOSIS — R70.0 ELEVATED SED RATE: ICD-10-CM

## 2024-08-27 PROBLEM — E66.3 OVERWEIGHT (BMI 25.0-29.9): Status: ACTIVE | Noted: 2024-08-27

## 2024-08-27 LAB
ALBUMIN SERPL-MCNC: 3.9 G/DL (ref 3.5–5.2)
ALBUMIN/GLOB SERPL: 1.5 G/DL
ALP SERPL-CCNC: 72 U/L (ref 39–117)
ALT SERPL W P-5'-P-CCNC: 29 U/L (ref 1–33)
ANION GAP SERPL CALCULATED.3IONS-SCNC: 10 MMOL/L (ref 5–15)
AST SERPL-CCNC: 16 U/L (ref 1–32)
BILIRUB SERPL-MCNC: 0.3 MG/DL (ref 0–1.2)
BUN SERPL-MCNC: 9 MG/DL (ref 6–20)
BUN/CREAT SERPL: 11.7 (ref 7–25)
CALCIUM SPEC-SCNC: 9.4 MG/DL (ref 8.6–10.5)
CHLORIDE SERPL-SCNC: 100 MMOL/L (ref 98–107)
CO2 SERPL-SCNC: 29 MMOL/L (ref 22–29)
CREAT SERPL-MCNC: 0.77 MG/DL (ref 0.57–1)
CRP SERPL-MCNC: 0.73 MG/DL (ref 0–0.5)
EGFRCR SERPLBLD CKD-EPI 2021: 95.9 ML/MIN/1.73
GLOBULIN UR ELPH-MCNC: 2.6 GM/DL
GLUCOSE SERPL-MCNC: 79 MG/DL (ref 65–99)
POTASSIUM SERPL-SCNC: 3.4 MMOL/L (ref 3.5–5.2)
PROT SERPL-MCNC: 6.5 G/DL (ref 6–8.5)
SODIUM SERPL-SCNC: 139 MMOL/L (ref 136–145)

## 2024-08-27 PROCEDURE — 86140 C-REACTIVE PROTEIN: CPT

## 2024-08-27 PROCEDURE — 85652 RBC SED RATE AUTOMATED: CPT

## 2024-08-27 PROCEDURE — 80053 COMPREHEN METABOLIC PANEL: CPT

## 2024-08-27 PROCEDURE — 82306 VITAMIN D 25 HYDROXY: CPT

## 2024-08-27 PROCEDURE — 85027 COMPLETE CBC AUTOMATED: CPT

## 2024-08-27 PROCEDURE — 36415 COLL VENOUS BLD VENIPUNCTURE: CPT

## 2024-08-27 NOTE — PROGRESS NOTES
Subjective   Stacia Gomez is a 49 y.o. female.     Chief Complaint   Patient presents with    Obesity     Follow up weight check        PCP: Cheryl Velasquez APRN        History of Present Illness  The patient is a 49-year-old female here to follow up on obesity. She is here for a weight check and is currently utilizing Wegovy at 1.7 mg weekly.    She has been administering Wegovy every other week instead of weekly for the past two months due to feeling full after only a few bites on the weekly regimen. She also experienced nausea and bloating, which have improved since switching to the bi-weekly schedule. The first few days after the injection, she experiences frequent bowel movements and stomach pain, but this is not a new symptom as she has had chronic stomach pain. She does not recall any significant difference between the 1.7 mg dose and the previous one.    She missed her mammogram appointment in 07/2023 due to not receiving a reminder notification. Despite several attempts, she has been unable to reschedule the appointment. She has previously undergone colon cancer screening using Cologuard and had a normal colonoscopy in 01/2023. She is scheduled for a follow-up with Dr. Crespo on 09/09/2024. Her last tetanus vaccine was administered 27 years ago during her pregnancy.    She has been experiencing increased stress due to long work hours and has been less active than usual. She continues to use naltrexone powder for her fibromyalgia, which has been more active recently. She experiences severe joint pain at night and has had adverse reactions to baclofen, including heart palpitations, jitters, and nausea. She uses cannabis oil for temporary relief and lidocaine patches on her hip, where she had an injury. She is awaiting an appointment with her rheumatologist. She takes two trazodone pills daily for sleep and Phenergan for migraines. She has had more fibromyalgia flares over the past month, which she  attributes to stress.    She has been taking metoprolol for palpitations for some time but occasionally experiences severe palpitations at night, causing anxiety and breathlessness. She sometimes feels as if her heart stops, which exacerbates her anxiety and disrupts her sleep. These symptoms started after she contracted COVID-19 in 2024. She experiences these episodes once or twice a week, usually around 11 PM, two hours after taking her medication. She has had a Holter monitor and event recorder tests, both of which were normal. 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    Results      Lab Results   Component Value Date    WBC 5.30 12/08/2023    HGB 13.1 12/08/2023    HCT 41.1 12/08/2023    MCV 91.3 12/08/2023     12/08/2023     Lab Results   Component Value Date    GLUCOSE 91 12/08/2023    BUN 8 12/08/2023    CREATININE 0.89 12/08/2023    EGFR 81.1 12/08/2023    BCR 9.0 12/08/2023    K 3.2 (L) 12/08/2023    CO2 28.0 12/08/2023    CALCIUM 9.0 12/08/2023    ALBUMIN 4.2 12/08/2023    BILITOT 0.5 12/08/2023    AST 13 12/08/2023    ALT 11 12/08/2023     Lab Results   Component Value Date    CHOL 172 09/01/2023    TRIG 72 09/01/2023    HDL 63 (H) 09/01/2023    LDL 95 09/01/2023     Lab Results   Component Value Date    TSH 0.447 09/01/2023     A1C Last 3 Results          1/12/2024    08:54   HGBA1C Last 3 Results   Hemoglobin A1C 5.2        The following portions of the patient's history were reviewed and updated as appropriate: allergies, current medications, past family history, past medical history, past social history, past surgical history and problem list.              Outpatient Medications Marked as Taking for the 8/26/24 encounter (Office Visit) with Cheryl Velasquez APRN   Medication Sig Dispense Refill    albuterol sulfate  (90 Base) MCG/ACT inhaler Inhale 2 puffs Every 4 (Four) Hours As Needed for Wheezing. 18 g 1    buPROPion XL (WELLBUTRIN  XL) 300 MG 24 hr tablet Take 1 tablet by mouth Daily. 30 tablet 0    EPINEPHrine (EPIPEN) 0.3 MG/0.3ML solution auto-injector injection       famotidine (PEPCID) 20 MG tablet Take 1 tablet by mouth Daily.      fexofenadine (ALLEGRA) 60 MG tablet Take 1 tablet by mouth Daily. 30 tablet 0    hydroCHLOROthiazide 25 MG tablet Take 1 tablet by mouth Daily. 30 tablet 0    metoprolol tartrate (LOPRESSOR) 25 MG tablet TAKE 1 TABLET BY MOUTH TWICE DAILY 180 tablet 0    Naltrexone powder Use 1 Units Daily. Naltrexone 100% powder   Compounded  2mg low dose naltrexone compounded lactose free  2mg, 1 po qd  Dispense quantity 30 gram with 3 refills 30 g 4    promethazine (PHENERGAN) 25 MG tablet Take 1 tablet by mouth Every 6 (Six) Hours As Needed for Nausea or Vomiting. 40 tablet 4    Semaglutide-Weight Management (Wegovy) 1.7 MG/0.75ML solution auto-injector Inject 0.75 mL under the skin into the appropriate area as directed Every 7 (Seven) Days. 3 mL 3    traZODone (DESYREL) 50 MG tablet Take 1-2 tablets by mouth every night at bedtime. 60 tablet 1    Ubrelvy 100 MG tablet Take 1 tablet by mouth 2 (Two) Times a Day As Needed.      [DISCONTINUED] Semaglutide-Weight Management (Wegovy) 1.7 MG/0.75ML solution auto-injector Inject 0.75 mL under the skin into the appropriate area as directed Every 7 (Seven) Days. (Patient taking differently: Inject 0.75 mL under the skin into the appropriate area as directed Every 7 (Seven) Days. Pt is injecting every other week) 3 mL 1     Allergies   Allergen Reactions    Dog Epithelium (Canis Lupus Familiaris) Hives and Itching    Prunus Persica Hives and Itching    Shellfish Allergy Anaphylaxis     Coughing, hives, carry Epipen    Alimentum Hives    Lemon Oil Hives    Mangifera Indica Hives    Pineapple Hives    Tomato Hives    Toradol [Ketorolac Tromethamine] Unknown - High Severity     Previous reaction with anesthesia    Amoxicillin Hives    Baclofen Other (See Comments)      "Nervousness/jitters    Peanut Oil Itching    Ascorbate Rash    Strawberry Itching and Rash           Objective     Vitals:    08/26/24 1436   BP: 116/72   BP Location: Right arm   Patient Position: Sitting   Cuff Size: Adult   Pulse: 64   Resp: 18   Temp: 97.1 °F (36.2 °C)   TempSrc: Infrared   Weight: 71.1 kg (156 lb 12.8 oz)   Height: 157.5 cm (62\")     Body mass index is 28.68 kg/m².  Wt Readings from Last 3 Encounters:   08/26/24 71.1 kg (156 lb 12.8 oz)   05/10/24 73.9 kg (163 lb)   05/03/24 75.9 kg (167 lb 6.4 oz)     Physical Exam  Vitals and nursing note reviewed.   Constitutional:       General: She is not in acute distress.     Appearance: Normal appearance. She is well-developed. She is not ill-appearing or diaphoretic.   HENT:      Head: Normocephalic and atraumatic.   Eyes:      General: No scleral icterus.     Conjunctiva/sclera: Conjunctivae normal.   Neck:      Vascular: No JVD.   Cardiovascular:      Rate and Rhythm: Normal rate and regular rhythm.      Chest Wall: PMI is not displaced. No thrill.      Heart sounds: Normal heart sounds. No murmur heard.  Pulmonary:      Effort: Pulmonary effort is normal. No accessory muscle usage or respiratory distress.      Breath sounds: Normal breath sounds.   Chest:      Chest wall: No tenderness.   Abdominal:      General: Bowel sounds are normal. There is no distension.      Palpations: Abdomen is soft.      Tenderness: There is no abdominal tenderness.   Musculoskeletal:         General: Normal range of motion.      Cervical back: Normal range of motion.      Right lower leg: No edema.      Left lower leg: No edema.   Skin:     General: Skin is warm and dry.      Coloration: Skin is not ashen, jaundiced, mottled or pale.      Findings: No erythema.   Neurological:      General: No focal deficit present.      Mental Status: She is alert and oriented to person, place, and time.   Psychiatric:         Attention and Perception: Attention normal.         Mood " and Affect: Mood and affect normal.         Behavior: Behavior normal. Behavior is cooperative.         Thought Content: Thought content normal.         Cognition and Memory: Cognition normal.         Judgment: Judgment normal.                   Assessment & Plan   Diagnoses and all orders for this visit:    1. Overweight (BMI 25.0-29.9) (Primary)    2. Primary hypertension    3. Palpitations    4. Fibromyalgia    Other orders  -     Semaglutide-Weight Management (Wegovy) 1.7 MG/0.75ML solution auto-injector; Inject 0.75 mL under the skin into the appropriate area as directed Every 7 (Seven) Days.  Dispense: 3 mL; Refill: 3        Assessment & Plan  1. Obesity.  She has achieved a weight loss of 7 to 8 pounds over the past 3 months. The current regimen of Wegovy 1.7 mg every other week will be maintained, with no plans to increase the dosage. She reports tolerating this regimen well and prefers to continue it.    2. Fibromyalgia.  She reports increased fibromyalgia symptoms over the past several months, likely due to increased stress and decreased physical activity. She is currently using naltrexone powder and finds some relief with cannabis oil. Baclofen was previously discontinued due to side effects. The possibility of using Cymbalta (duloxetine) was discussed, but it was decided not to proceed due to her current use of trazodone for sleep. She is advised to focus on stress reduction and increasing physical activity. If symptoms worsen, a steroid pack may be considered.    3. Palpitations.  She reports experiencing heart palpitations, particularly at night, which have been causing anxiety and sleep disturbances. These symptoms have been occurring once or twice a week. Her current regimen of metoprolol 1/2 pill bid will be adjusted to a full pill at night and half a pill in the morning. If symptoms persist, she should reach out for further evaluation.    4. Health Maintenance.  She missed her scheduled mammogram  and has had difficulty rescheduling it. She is advised to call today to reschedule and inform if any issues arise. A request for her colonoscopy report from January 2023 will be made. She declined the Tdap vaccine today but was informed about its importance and advised to consider it in the future.    Follow-up  The patient will follow up in 3 months.            Return in about 3 months (around 11/26/2024) for chronic condition follow up.    I discussed my findings,recommendations, and plan of care was with the patient. They verbalized understanding and agreement.  Patient was encouraged to keep me informed of any acute changes, lack of improvement, or any new concerning symptoms.     Patient or patient representative verbalized consent for the use of Ambient Listening during the visit with  RIP Tillman for chart documentation. 8/27/2024  20:04 EDT

## 2024-08-28 LAB
25(OH)D3 SERPL-MCNC: 54.4 NG/ML (ref 30–100)
DEPRECATED RDW RBC AUTO: 37 FL (ref 37–54)
ERYTHROCYTE [DISTWIDTH] IN BLOOD BY AUTOMATED COUNT: 11.7 % (ref 12.3–15.4)
ERYTHROCYTE [SEDIMENTATION RATE] IN BLOOD: 22 MM/HR (ref 0–20)
HCT VFR BLD AUTO: 36.9 % (ref 34–46.6)
HGB BLD-MCNC: 12.1 G/DL (ref 12–15.9)
MCH RBC QN AUTO: 29.1 PG (ref 26.6–33)
MCHC RBC AUTO-ENTMCNC: 32.8 G/DL (ref 31.5–35.7)
MCV RBC AUTO: 88.7 FL (ref 79–97)
PLATELET # BLD AUTO: 380 10*3/MM3 (ref 140–450)
PMV BLD AUTO: 10.2 FL (ref 6–12)
RBC # BLD AUTO: 4.16 10*6/MM3 (ref 3.77–5.28)
WBC NRBC COR # BLD AUTO: 4.08 10*3/MM3 (ref 3.4–10.8)

## 2024-08-30 DIAGNOSIS — M25.50 ARTHRALGIA, UNSPECIFIED JOINT: ICD-10-CM

## 2024-08-30 DIAGNOSIS — M79.7 FIBROMYALGIA: Primary | ICD-10-CM

## 2024-09-06 ENCOUNTER — PATIENT MESSAGE (OUTPATIENT)
Dept: INTERNAL MEDICINE | Facility: CLINIC | Age: 49
End: 2024-09-06
Payer: COMMERCIAL

## 2024-09-07 RX ORDER — FLUCONAZOLE 150 MG/1
150 TABLET ORAL ONCE
Qty: 1 TABLET | Refills: 0 | Status: SHIPPED | OUTPATIENT
Start: 2024-09-07 | End: 2024-09-07

## 2024-09-07 NOTE — TELEPHONE ENCOUNTER
From: Stacia Gomez  To: Cheryl Velasquez  Sent: 9/6/2024 9:59 AM EDT  Subject: Issues after allergy testing     I had allergy testing on Tuesday and experienced a flare resulting in a yeast infection.   Requesting Fluconazole for relief.

## 2024-09-09 RX ORDER — TRAZODONE HYDROCHLORIDE 50 MG/1
50-100 TABLET, FILM COATED ORAL
Qty: 60 TABLET | Refills: 1 | Status: SHIPPED | OUTPATIENT
Start: 2024-09-09 | End: 2024-09-10 | Stop reason: SDUPTHER

## 2024-09-09 NOTE — TELEPHONE ENCOUNTER
Rx Refill Note  Requested Prescriptions     Pending Prescriptions Disp Refills    traZODone (DESYREL) 50 MG tablet [Pharmacy Med Name: TRAZODONE 50MG TABLETS] 60 tablet 1     Sig: TAKE 1 TO 2 TABLETS BY MOUTH EVERY NIGHT AT BEDTIME      Last office visit with prescribing clinician: 8/26/2024     Next office visit with prescribing clinician: 11/26/2024    Debbi Dias MA  09/09/24, 11:39 EDT   
5

## 2024-09-10 RX ORDER — TRAZODONE HYDROCHLORIDE 50 MG/1
50-100 TABLET, FILM COATED ORAL
Qty: 60 TABLET | Refills: 1 | OUTPATIENT
Start: 2024-09-10

## 2024-09-10 RX ORDER — TRAZODONE HYDROCHLORIDE 50 MG/1
50-100 TABLET, FILM COATED ORAL
Qty: 60 TABLET | Refills: 1 | Status: SHIPPED | OUTPATIENT
Start: 2024-09-10

## 2024-09-11 ENCOUNTER — HOSPITAL ENCOUNTER (EMERGENCY)
Facility: HOSPITAL | Age: 49
Discharge: HOME | End: 2024-09-12
Attending: EMERGENCY MEDICINE
Payer: COMMERCIAL

## 2024-09-11 ENCOUNTER — PATIENT MESSAGE (OUTPATIENT)
Dept: INTERNAL MEDICINE | Facility: CLINIC | Age: 49
End: 2024-09-11
Payer: COMMERCIAL

## 2024-09-11 VITALS
BODY MASS INDEX: 28.34 KG/M2 | HEIGHT: 62 IN | SYSTOLIC BLOOD PRESSURE: 120 MMHG | DIASTOLIC BLOOD PRESSURE: 76 MMHG | WEIGHT: 154 LBS | OXYGEN SATURATION: 98 % | TEMPERATURE: 98.4 F | RESPIRATION RATE: 15 BRPM | HEART RATE: 77 BPM

## 2024-09-11 DIAGNOSIS — R10.13 EPIGASTRIC ABDOMINAL PAIN: Primary | ICD-10-CM

## 2024-09-11 LAB
ALBUMIN SERPL-MCNC: 3.8 G/DL (ref 3.5–5.2)
ALBUMIN/GLOB SERPL: 1.1 G/DL
ALP SERPL-CCNC: 62 U/L (ref 39–117)
ALT SERPL W P-5'-P-CCNC: 8 U/L (ref 1–33)
ANION GAP SERPL CALCULATED.3IONS-SCNC: 13.8 MMOL/L (ref 5–15)
AST SERPL-CCNC: 22 U/L (ref 1–32)
BASOPHILS # BLD AUTO: 0.02 10*3/MM3 (ref 0–0.2)
BASOPHILS NFR BLD AUTO: 0.4 % (ref 0–1.5)
BILIRUB SERPL-MCNC: 0.4 MG/DL (ref 0–1.2)
BILIRUB UR QL STRIP: NEGATIVE
BUN SERPL-MCNC: 10 MG/DL (ref 6–20)
BUN/CREAT SERPL: 11.8 (ref 7–25)
CALCIUM SPEC-SCNC: 9.3 MG/DL (ref 8.6–10.5)
CHLORIDE SERPL-SCNC: 100 MMOL/L (ref 98–107)
CLARITY UR: CLEAR
CO2 SERPL-SCNC: 23.2 MMOL/L (ref 22–29)
COLOR UR: YELLOW
CREAT SERPL-MCNC: 0.85 MG/DL (ref 0.57–1)
D-LACTATE SERPL-SCNC: 0.9 MMOL/L (ref 0.5–2)
DEPRECATED RDW RBC AUTO: 43.4 FL (ref 37–54)
EGFRCR SERPLBLD CKD-EPI 2021: 85.2 ML/MIN/1.73
EOSINOPHIL # BLD AUTO: 0.04 10*3/MM3 (ref 0–0.4)
EOSINOPHIL NFR BLD AUTO: 0.7 % (ref 0.3–6.2)
ERYTHROCYTE [DISTWIDTH] IN BLOOD BY AUTOMATED COUNT: 12.7 % (ref 12.3–15.4)
GLOBULIN UR ELPH-MCNC: 3.5 GM/DL
GLUCOSE SERPL-MCNC: 84 MG/DL (ref 65–99)
GLUCOSE UR STRIP-MCNC: NEGATIVE MG/DL
HCT VFR BLD AUTO: 39.7 % (ref 34–46.6)
HGB BLD-MCNC: 12.7 G/DL (ref 12–15.9)
HGB UR QL STRIP.AUTO: NEGATIVE
IMM GRANULOCYTES # BLD AUTO: 0 10*3/MM3 (ref 0–0.05)
IMM GRANULOCYTES NFR BLD AUTO: 0 % (ref 0–0.5)
KETONES UR QL STRIP: NEGATIVE
LEUKOCYTE ESTERASE UR QL STRIP.AUTO: NEGATIVE
LIPASE SERPL-CCNC: 21 U/L (ref 13–60)
LYMPHOCYTES # BLD AUTO: 1.99 10*3/MM3 (ref 0.7–3.1)
LYMPHOCYTES NFR BLD AUTO: 36 % (ref 19.6–45.3)
MCH RBC QN AUTO: 29.5 PG (ref 26.6–33)
MCHC RBC AUTO-ENTMCNC: 32 G/DL (ref 31.5–35.7)
MCV RBC AUTO: 92.1 FL (ref 79–97)
MONOCYTES # BLD AUTO: 0.59 10*3/MM3 (ref 0.1–0.9)
MONOCYTES NFR BLD AUTO: 10.7 % (ref 5–12)
NEUTROPHILS NFR BLD AUTO: 2.89 10*3/MM3 (ref 1.7–7)
NEUTROPHILS NFR BLD AUTO: 52.2 % (ref 42.7–76)
NITRITE UR QL STRIP: NEGATIVE
PH UR STRIP.AUTO: 6 [PH] (ref 5–8)
PLATELET # BLD AUTO: 357 10*3/MM3 (ref 140–450)
PMV BLD AUTO: 10.2 FL (ref 6–12)
POTASSIUM SERPL-SCNC: 4 MMOL/L (ref 3.5–5.2)
PROT SERPL-MCNC: 7.3 G/DL (ref 6–8.5)
PROT UR QL STRIP: NEGATIVE
RBC # BLD AUTO: 4.31 10*6/MM3 (ref 3.77–5.28)
SODIUM SERPL-SCNC: 137 MMOL/L (ref 136–145)
SP GR UR STRIP: >=1.03 (ref 1–1.03)
UROBILINOGEN UR QL STRIP: NORMAL
WBC NRBC COR # BLD AUTO: 5.53 10*3/MM3 (ref 3.4–10.8)

## 2024-09-11 PROCEDURE — 85025 COMPLETE CBC W/AUTO DIFF WBC: CPT | Performed by: EMERGENCY MEDICINE

## 2024-09-11 PROCEDURE — 83690 ASSAY OF LIPASE: CPT | Performed by: EMERGENCY MEDICINE

## 2024-09-11 PROCEDURE — 81003 URINALYSIS AUTO W/O SCOPE: CPT | Performed by: EMERGENCY MEDICINE

## 2024-09-11 PROCEDURE — 99285 EMERGENCY DEPT VISIT HI MDM: CPT

## 2024-09-11 PROCEDURE — 83605 ASSAY OF LACTIC ACID: CPT | Performed by: EMERGENCY MEDICINE

## 2024-09-11 PROCEDURE — 80053 COMPREHEN METABOLIC PANEL: CPT | Performed by: EMERGENCY MEDICINE

## 2024-09-11 PROCEDURE — 25810000003 SODIUM CHLORIDE 0.9 % SOLUTION: Performed by: EMERGENCY MEDICINE

## 2024-09-11 RX ORDER — ALUMINA, MAGNESIA, AND SIMETHICONE 2400; 2400; 240 MG/30ML; MG/30ML; MG/30ML
15 SUSPENSION ORAL ONCE
Status: COMPLETED | OUTPATIENT
Start: 2024-09-12 | End: 2024-09-12

## 2024-09-11 RX ORDER — HYDROXYZINE HYDROCHLORIDE 10 MG/1
10 TABLET, FILM COATED ORAL 2 TIMES DAILY PRN
COMMUNITY

## 2024-09-11 RX ORDER — SODIUM CHLORIDE 0.9 % (FLUSH) 0.9 %
10 SYRINGE (ML) INJECTION AS NEEDED
Status: DISCONTINUED | OUTPATIENT
Start: 2024-09-11 | End: 2024-09-12 | Stop reason: HOSPADM

## 2024-09-11 RX ADMIN — SODIUM CHLORIDE 1000 ML: 9 INJECTION, SOLUTION INTRAVENOUS at 23:32

## 2024-09-11 NOTE — TELEPHONE ENCOUNTER
From: Stacia Gomez  To: Cheryl Velasquez  Sent: 9/11/2024 11:10 AM EDT  Subject: Stomach pain     I am experiencing stomach pain, with pain radiating to my chest and shoulders.   I took Gaviscon, Voquenza and Dicylomine within the last 12 hours and nothing seems to resolve the pain. Im also nauseated and very fatigue. I wanted to check if there was anything else to do before making a trip to the ER. I have contacted my GI doctor to report the issues as well.     Please advise. Thank you!

## 2024-09-12 ENCOUNTER — APPOINTMENT (OUTPATIENT)
Facility: HOSPITAL | Age: 49
End: 2024-09-12
Payer: COMMERCIAL

## 2024-09-12 PROCEDURE — 74177 CT ABD & PELVIS W/CONTRAST: CPT

## 2024-09-12 PROCEDURE — 25510000001 IOPAMIDOL 61 % SOLUTION: Performed by: EMERGENCY MEDICINE

## 2024-09-12 RX ORDER — IOPAMIDOL 612 MG/ML
100 INJECTION, SOLUTION INTRAVASCULAR
Status: COMPLETED | OUTPATIENT
Start: 2024-09-12 | End: 2024-09-12

## 2024-09-12 RX ADMIN — IOPAMIDOL 75 ML: 612 INJECTION, SOLUTION INTRAVENOUS at 00:06

## 2024-09-12 RX ADMIN — ALUMINUM HYDROXIDE, MAGNESIUM HYDROXIDE, DIMETHICONE 15 ML: 400; 400; 40 SUSPENSION ORAL at 00:31

## 2024-09-12 NOTE — FSED PROVIDER NOTE
Subjective   History of Present Illness  Patient presents to the emergency department for epigastric abdominal pain.  Describes sharp pain starting yesterday.  She always has some level of pain but says it has been worse over the past day.  Does not seem to change with eating or drinking.  Does have a history of reflux but says this feels slightly worse.  Denies nausea vomiting or diarrhea.  No fevers    History provided by:  Patient   used: No        Review of Systems   Gastrointestinal:  Positive for abdominal pain.   All other systems reviewed and are negative.      Past Medical History:   Diagnosis Date    Anxiety     Asthma     as a child    Fibromyalgia, primary     GERD (gastroesophageal reflux disease)     Headache     History of medical problems     Meineires Disease    HL (hearing loss) 08/22    Inflammatory bowel disease     Palpitation        Allergies   Allergen Reactions    Dog Epithelium (Canis Lupus Familiaris) Hives and Itching    Prunus Persica Hives and Itching    Shellfish Allergy Anaphylaxis     Coughing, hives, carry Epipen    Alimentum Hives    Lemon Oil Hives    Mangifera Indica Hives    Pineapple Hives    Tomato Hives    Toradol [Ketorolac Tromethamine] Unknown - High Severity     Previous reaction with anesthesia    Amoxicillin Hives    Baclofen Other (See Comments)     Nervousness/jitters    Peanut Oil Itching    Ascorbate Rash    Strawberry Itching and Rash       Past Surgical History:   Procedure Laterality Date    CHOLECYSTECTOMY  09/2016    COLONOSCOPY  01/2023    ENDOMETRIAL ABLATION      HYSTERECTOMY  01/2014    OVARIAN CYST REMOVAL Left 01/29/2024    TUBAL ABDOMINAL LIGATION  07/1999    TUBAL ABDOMINAL LIGATION Left 01/29/2024       Family History   Problem Relation Age of Onset    Arthritis Mother     Hyperlipidemia Mother     Vision loss Mother     Other Father         Amyloidosis    Heart disease Maternal Grandmother     Cancer Sister         Breast canchloee        Social History     Socioeconomic History    Marital status:    Tobacco Use    Smoking status: Never     Passive exposure: Never    Smokeless tobacco: Never   Vaping Use    Vaping status: Never Used   Substance and Sexual Activity    Alcohol use: Yes     Alcohol/week: 3.0 standard drinks of alcohol     Types: 1 Glasses of wine, 1 Shots of liquor, 1 Drinks containing 0.5 oz of alcohol per week    Drug use: Never    Sexual activity: Yes     Partners: Male     Birth control/protection: Tubal ligation, Hysterectomy           Objective   Physical Exam  Vitals and nursing note reviewed.   Constitutional:       Appearance: She is well-developed.   HENT:      Head: Normocephalic and atraumatic.      Mouth/Throat:      Mouth: Mucous membranes are moist.   Eyes:      Extraocular Movements: Extraocular movements intact.      Pupils: Pupils are equal, round, and reactive to light.   Cardiovascular:      Rate and Rhythm: Normal rate and regular rhythm.      Heart sounds: Normal heart sounds.   Pulmonary:      Effort: Pulmonary effort is normal. No respiratory distress.      Breath sounds: Normal breath sounds. No wheezing.   Abdominal:      General: Abdomen is flat. Bowel sounds are normal.      Palpations: Abdomen is soft. There is no shifting dullness.      Tenderness: There is abdominal tenderness in the epigastric area. There is no guarding or rebound. Negative signs include Marquez's sign.   Skin:     General: Skin is warm and dry.      Capillary Refill: Capillary refill takes less than 2 seconds.      Coloration: Skin is not cyanotic.   Neurological:      General: No focal deficit present.      Mental Status: She is alert and oriented to person, place, and time.      Cranial Nerves: No cranial nerve deficit.   Psychiatric:         Mood and Affect: Mood normal. Mood is not anxious.         Behavior: Behavior normal.         Procedures           ED Course                                           Medical Decision  Making  Hemodynamically stable and afebrile.  Patient has some mild epigastric tenderness but no peritoneal signs.  Laboratory evaluation is unremarkable.  Otherwise well-appearing encouraged follow-up with discharge    Problems Addressed:  Epigastric abdominal pain: complicated acute illness or injury    Amount and/or Complexity of Data Reviewed  Labs: ordered. Decision-making details documented in ED Course.  Radiology: ordered. Decision-making details documented in ED Course.    Risk  OTC drugs.  Prescription drug management.        Final diagnoses:   Epigastric abdominal pain       ED Disposition  ED Disposition       ED Disposition   Discharge    Condition   Stable    Comment   --               Cheryl Velasquez, APRN  [address] [facility]  [address]  [place] [address]  [phone]    Schedule an appointment as soon as possible for a visit   As needed    [facility] EMERGENCY DEPARTMENT [place]  [address] [facility] [address] [facility] [address]  [place], located within [facility] [address] [phone]  Go to   As needed         Medication List      No changes were made to your prescriptions during this visit.

## 2024-09-20 ENCOUNTER — PATIENT MESSAGE (OUTPATIENT)
Dept: INTERNAL MEDICINE | Facility: CLINIC | Age: 49
End: 2024-09-20
Payer: COMMERCIAL

## 2024-09-20 DIAGNOSIS — R05.1 ACUTE COUGH: Primary | ICD-10-CM

## 2024-09-20 RX ORDER — BROMPHENIRAMINE MALEATE, PSEUDOEPHEDRINE HYDROCHLORIDE, AND DEXTROMETHORPHAN HYDROBROMIDE 2; 30; 10 MG/5ML; MG/5ML; MG/5ML
5 SYRUP ORAL 4 TIMES DAILY PRN
Qty: 118 ML | Refills: 1 | Status: SHIPPED | OUTPATIENT
Start: 2024-09-20

## 2024-11-01 ENCOUNTER — OFFICE VISIT (OUTPATIENT)
Dept: INTERNAL MEDICINE | Facility: CLINIC | Age: 49
End: 2024-11-01
Payer: COMMERCIAL

## 2024-11-01 VITALS
TEMPERATURE: 97.3 F | OXYGEN SATURATION: 97 % | WEIGHT: 152.2 LBS | HEART RATE: 69 BPM | DIASTOLIC BLOOD PRESSURE: 70 MMHG | HEIGHT: 62 IN | SYSTOLIC BLOOD PRESSURE: 110 MMHG | BODY MASS INDEX: 28.01 KG/M2

## 2024-11-01 DIAGNOSIS — H92.01 EAR PAIN, RIGHT: ICD-10-CM

## 2024-11-01 DIAGNOSIS — S03.40XA TMJ (SPRAIN OF TEMPOROMANDIBULAR JOINT), INITIAL ENCOUNTER: Primary | ICD-10-CM

## 2024-11-01 PROCEDURE — 99213 OFFICE O/P EST LOW 20 MIN: CPT | Performed by: STUDENT IN AN ORGANIZED HEALTH CARE EDUCATION/TRAINING PROGRAM

## 2024-11-01 RX ORDER — AZITHROMYCIN 250 MG/1
TABLET, FILM COATED ORAL
Qty: 6 TABLET | Refills: 0 | Status: SHIPPED | OUTPATIENT
Start: 2024-11-01

## 2024-11-01 RX ORDER — PREDNISONE 10 MG/1
10 TABLET ORAL DAILY
Qty: 5 TABLET | Refills: 0 | Status: SHIPPED | OUTPATIENT
Start: 2024-11-01 | End: 2024-11-06

## 2024-11-01 RX ORDER — DICYCLOMINE HCL 20 MG
20 TABLET ORAL AS NEEDED
COMMUNITY
Start: 2024-09-12 | End: 2025-09-12

## 2024-11-01 RX ORDER — CYCLOBENZAPRINE HCL 5 MG
5 TABLET ORAL 3 TIMES DAILY PRN
Qty: 15 TABLET | Refills: 0 | Status: SHIPPED | OUTPATIENT
Start: 2024-11-01

## 2024-11-01 RX ORDER — FLUCONAZOLE 100 MG/1
1 TABLET ORAL DAILY
COMMUNITY
Start: 2024-09-06

## 2024-11-01 NOTE — PROGRESS NOTES
Office Note     Name: Stacia Gomez    : 1975     MRN: [id number]     Chief Complaint  Earache and Jaw Pain (Rt side cannot open mouth wide and feels a bit swollen )    Subjective     History of Present Illness:  Stacia Gomez is a 49 y.o. female who presents today for     Started having right-sided jaw pain and ear pain.  Symptoms onset was couple days ago.  Difficult to open her mouth fully.  Denies any trauma to the area has never happened to her before denies any decreased hearing no tinnitus no vertigo.    Review of Systems:   Review of Systems   All other systems reviewed and are negative.      Past Medical History:   Past Medical History:   Diagnosis Date    Anxiety     Asthma     as a child    Fibromyalgia, primary     GERD (gastroesophageal reflux disease)     Headache     History of medical problems     Meineires Disease    HL (hearing loss)     Inflammatory bowel disease     Palpitation        Past Surgical History:   Past Surgical History:   Procedure Laterality Date    CHOLECYSTECTOMY  2016    COLONOSCOPY  2023    ENDOMETRIAL ABLATION      HYSTERECTOMY  2014    OVARIAN CYST REMOVAL Left 2024    TUBAL ABDOMINAL LIGATION  1999    TUBAL ABDOMINAL LIGATION Left 2024       Family History:   Family History   Problem Relation Age of Onset    Arthritis Mother     Hyperlipidemia Mother     Vision loss Mother     Heart disease Maternal Grandmother     Cancer Sister         Breast cancee       Social History:   Social History     Socioeconomic History    Marital status:    Tobacco Use    Smoking status: Never     Passive exposure: Never    Smokeless tobacco: Never   Vaping Use    Vaping status: Never Used   Substance and Sexual Activity    Alcohol use: Yes     Alcohol/week: 3.0 standard drinks of alcohol     Types: 1 Glasses of wine, 1 Shots of liquor, 1 Drinks containing 0.5 oz of alcohol per week    Drug use: Never    Sexual activity: Yes     Partners: Male     Birth  control/protection: Tubal ligation, Hysterectomy       Immunizations:   Immunization History   Administered Date(s) Administered    Hepatitis B Adult/Adolescent IM 01/06/2004, 06/02/2004        Medications:     Current Outpatient Medications:     albuterol sulfate  (90 Base) MCG/ACT inhaler, Inhale 2 puffs Every 4 (Four) Hours As Needed for Wheezing., Disp: 18 g, Rfl: 1    brompheniramine-pseudoephedrine-DM 30-2-10 MG/5ML syrup, Take 5 mL by mouth 4 (Four) Times a Day As Needed for Cough or Congestion., Disp: 118 mL, Rfl: 1    buPROPion XL (WELLBUTRIN XL) 300 MG 24 hr tablet, Take 1 tablet by mouth Daily., Disp: 30 tablet, Rfl: 0    dicyclomine (BENTYL) 20 MG tablet, Take 1 tablet by mouth As Needed., Disp: , Rfl:     EPINEPHrine (EPIPEN) 0.3 MG/0.3ML solution auto-injector injection, Inject 0.3 mL into the appropriate muscle as directed by prescriber 1 (One) Time As Needed., Disp: , Rfl:     famotidine (PEPCID) 20 MG tablet, Take 1 tablet by mouth 2 (Two) Times a Day., Disp: , Rfl:     fexofenadine (ALLEGRA) 60 MG tablet, Take 1 tablet by mouth Daily., Disp: 30 tablet, Rfl: 0    fluconazole (DIFLUCAN) 100 MG tablet, Take 1 tablet by mouth Daily., Disp: , Rfl:     hydroCHLOROthiazide 25 MG tablet, Take 1 tablet by mouth Daily., Disp: 30 tablet, Rfl: 0    hydrOXYzine (ATARAX) 10 MG tablet, Take 1 tablet by mouth 2 (Two) Times a Day As Needed for Anxiety., Disp: , Rfl:     metoprolol tartrate (LOPRESSOR) 25 MG tablet, TAKE 1 TABLET BY MOUTH TWICE DAILY, Disp: 180 tablet, Rfl: 0    Naltrexone powder, Use 1 Units Daily. Naltrexone 100% powder  Compounded 2mg low dose naltrexone compounded lactose free 2mg, 1 po qd Dispense quantity 30 gram with 3 refills, Disp: 30 g, Rfl: 4    promethazine (PHENERGAN) 25 MG tablet, Take 1 tablet by mouth Every 6 (Six) Hours As Needed for Nausea or Vomiting., Disp: 40 tablet, Rfl: 4    Semaglutide-Weight Management (Wegovy) 1.7 MG/0.75ML solution auto-injector, Inject 0.75 mL  "under the skin into the appropriate area as directed Every 7 (Seven) Days., Disp: 3 mL, Rfl: 3    traZODone (DESYREL) 50 MG tablet, Take 1-2 tablets by mouth every night at bedtime., Disp: 60 tablet, Rfl: 1    Ubrelvy 100 MG tablet, Take 1 tablet by mouth 2 (Two) Times a Day As Needed., Disp: , Rfl:     azithromycin (Zithromax Z-Malcolm) 250 MG tablet, Take 2 tablets by mouth on day 1, then 1 tablet daily on days 2-5, Disp: 6 tablet, Rfl: 0    cyclobenzaprine (FLEXERIL) 5 MG tablet, Take 1 tablet by mouth 3 (Three) Times a Day As Needed for Muscle Spasms., Disp: 15 tablet, Rfl: 0    esomeprazole (nexIUM) 20 MG capsule, Take 1 capsule by mouth Daily As Needed. (Patient not taking: Reported on 11/1/2024), Disp: , Rfl:     Vonoprazan Fumarate (VOQUEZNA) 10 MG tablet, Take 1 tablet by mouth Daily. (Patient not taking: Reported on 11/1/2024), Disp: , Rfl:     Allergies:   Allergies   Allergen Reactions    Dog Epithelium (Canis Lupus Familiaris) Hives and Itching    Prunus Persica Hives and Itching    Shellfish Allergy Anaphylaxis     Coughing, hives, carry Epipen    Alimentum Hives    Lemon Oil Hives    Mangifera Indica Hives    Pineapple Hives    Tomato Hives    Toradol [Ketorolac Tromethamine] Unknown - High Severity     Previous reaction with anesthesia    Amoxicillin Hives    Baclofen Other (See Comments)     Nervousness/jitters    Peanut Oil Itching    Ascorbate Rash    Strawberry Itching and Rash       Objective     Vital Signs  /70   Pulse 69   Temp 97.3 °F (36.3 °C) (Temporal)   Ht 157.5 cm (62.01\")   Wt 69 kg (152 lb 3.2 oz)   SpO2 97%   BMI 27.83 kg/m²   Estimated body mass index is 27.83 kg/m² as calculated from the following:    Height as of this encounter: 157.5 cm (62.01\").    Weight as of this encounter: 69 kg (152 lb 3.2 oz).            Physical Exam  Constitutional:       Appearance: Normal appearance.   HENT:      Head:      Jaw: Tenderness and pain on movement present.   Cardiovascular:      " Rate and Rhythm: Normal rate and regular rhythm.      Pulses: Normal pulses.      Heart sounds: Normal heart sounds.   Pulmonary:      Effort: Pulmonary effort is normal.      Breath sounds: Normal breath sounds.   Neurological:      Mental Status: She is alert.          Result Review :                  Assessment and Plan     1. TMJ (sprain of temporomandibular joint), initial encounter  - predniSONE (DELTASONE) 10 MG tablet; Take 1 tablet by mouth Daily for 5 days.  Dispense: 5 tablet; Refill: 0  - azithromycin (Zithromax Z-Malcolm) 250 MG tablet; Take 2 tablets by mouth on day 1, then 1 tablet daily on days 2-5  Dispense: 6 tablet; Refill: 0  - cyclobenzaprine (FLEXERIL) 5 MG tablet; Take 1 tablet by mouth 3 (Three) Times a Day As Needed for Muscle Spasms.  Dispense: 15 tablet; Refill: 0    2. Ear pain, right    - azithromycin (Zithromax Z-Malcolm) 250 MG tablet; Take 2 tablets by mouth on day 1, then 1 tablet daily on days 2-5  Dispense: 6 tablet; Refill: 0       Follow Up  No follow-ups on file.    dE Anderson MD  [facility] [address]  [facility]  [address]  [address] [address] [phone]

## 2024-11-14 RX ORDER — TRAZODONE HYDROCHLORIDE 50 MG/1
50-100 TABLET, FILM COATED ORAL
Qty: 60 TABLET | Refills: 1 | Status: SHIPPED | OUTPATIENT
Start: 2024-11-14

## 2024-11-14 NOTE — TELEPHONE ENCOUNTER
Rx Refill Note  Requested Prescriptions     Pending Prescriptions Disp Refills    traZODone (DESYREL) 50 MG tablet [Pharmacy Med Name: TRAZODONE 50MG TABLETS] 60 tablet 1     Sig: TAKE 1 TO 2 TABLETS BY MOUTH EVERY NIGHT AT BEDTIME      Last office visit with prescribing clinician: 8/26/2024   Last telemedicine visit with prescribing clinician: Visit date not found   Next office visit with prescribing clinician: 11/26/2024       Patricia Lamb MA  11/14/24, 08:41 EST

## 2024-11-15 ENCOUNTER — PRIOR AUTHORIZATION (OUTPATIENT)
Dept: INTERNAL MEDICINE | Facility: CLINIC | Age: 49
End: 2024-11-15
Payer: COMMERCIAL

## 2024-11-15 NOTE — TELEPHONE ENCOUNTER
PA submitted on Wegovy 1.Octamer.  KEY:   [id number]  RX#:  [id number]  Office note has been faxed to [employer].

## 2024-12-02 ENCOUNTER — OFFICE VISIT (OUTPATIENT)
Dept: INTERNAL MEDICINE | Facility: CLINIC | Age: 49
End: 2024-12-02
Payer: COMMERCIAL

## 2024-12-02 VITALS
HEART RATE: 68 BPM | TEMPERATURE: 97.8 F | DIASTOLIC BLOOD PRESSURE: 70 MMHG | OXYGEN SATURATION: 99 % | BODY MASS INDEX: 35.73 KG/M2 | WEIGHT: 154.4 LBS | HEIGHT: 55 IN | SYSTOLIC BLOOD PRESSURE: 104 MMHG | RESPIRATION RATE: 14 BRPM

## 2024-12-02 DIAGNOSIS — F41.9 ANXIETY: ICD-10-CM

## 2024-12-02 DIAGNOSIS — I10 PRIMARY HYPERTENSION: Primary | ICD-10-CM

## 2024-12-02 DIAGNOSIS — F32.A DEPRESSIVE DISORDER: ICD-10-CM

## 2024-12-02 DIAGNOSIS — M79.7 FIBROMYALGIA: ICD-10-CM

## 2024-12-02 DIAGNOSIS — M25.50 ARTHRALGIA, UNSPECIFIED JOINT: ICD-10-CM

## 2024-12-02 DIAGNOSIS — R00.2 PALPITATIONS: ICD-10-CM

## 2024-12-02 DIAGNOSIS — F41.1 GAD (GENERALIZED ANXIETY DISORDER): ICD-10-CM

## 2024-12-02 PROCEDURE — 99214 OFFICE O/P EST MOD 30 MIN: CPT | Performed by: NURSE PRACTITIONER

## 2024-12-02 RX ORDER — NALTREXONE 100 %
1 POWDER (GRAM) MISCELLANEOUS DAILY
Qty: 30 G | Refills: 4 | Status: SHIPPED | OUTPATIENT
Start: 2024-12-02 | End: 2024-12-02 | Stop reason: SDUPTHER

## 2024-12-02 RX ORDER — NALTREXONE 100 %
1 POWDER (GRAM) MISCELLANEOUS DAILY
Qty: 30 G | Refills: 4 | Status: SHIPPED | OUTPATIENT
Start: 2024-12-02

## 2024-12-02 NOTE — PROGRESS NOTES
Subjective   Stacia Gomez is a 49 y.o. female.     Chief Complaint   Patient presents with    Fibromyalgia    Obesity    Hypertension       PCP: Cheryl Velasquez APRN    History of Present Illness     History of Present Illness  The patient is a 49-year-old female here for follow-up on hypertension, palpitations, fibromyalgia, and obesity.    At her last visit, she was taking half a pill of metoprolol twice a day. Due to palpitations that were worse at night, her dosage was increased to a full pill at night and half a pill in the morning. She reports that this adjustment did not significantly alleviate her palpitations, although they have improved. She suspects these symptoms may be related to premenopause. The palpitations occurred during this period and lasted for 1 to 2 weeks. After making the adjustments, the palpitations subsided, and she returned to taking half a pill twice a day. She reports no chest pain or shortness of breath. She has been dealing with lightheadedness and dizziness for a long time and has started taking Zofran, which does not cause drowsiness. She takes it during the day if she feels nauseous. She tries to adjust her schedule and take breaks from the computer, as she believes prolonged sitting may contribute to her symptoms. She is generally well-hydrated.    She has been struggling with weight issues throughout her life. She is currently taking Ozempic 1.7 mg every other week. She tries to maintain a healthy diet, including daily vegetable intake. She admits to being addicted to Coke and needs to increase her water intake. She avoids known triggers and eats smaller portions as she cannot consume large amounts of food anymore. She experiences severe cramps and spasms a day or two after taking Wegovy. She sometimes has loose stools. For the past month, she has been waking up at 2:00 AM to use the bathroom, spending 30 to 40 minutes there, and feeling drained afterward. Bentyl and Pepcid  provide some relief. She usually experiences nausea when she has stomach pain and takes Phenergan for relief. She expressed her concerns to Dr. Crespo, who scheduled a nuclear scan of her abdomen for next Tuesday. She believes that her gastric emptying is not delayed.    She experiences intermittent pain in her face, which she attributes to her fibromyalgia. She has also had issues with TMJ, which causes pain when opening her mouth. She has seen her dentist for this issue. Her fibromyalgia symptoms are inconsistent, with some days being symptom-free and others being painful, particularly in the hip area where she previously fell. She continues to use naltrexone powder and has an appointment with her rheumatologist next month. She is in need of a refill of cyclobenzaprine, which she uses for her fibromyalgia and had also used for some previous TMJ.    She is currently taking bupropion for her mood, which she describes as generally high. She is unsure if the medication is effective but is hesitant to try anything new. She finds her mood manageable. She notes that December is a particularly difficult month for her due to the anniversary of her father's passing nine years ago.    She has noticed that she often experiences vaginal irritation following an allergy flare-up. She is unsure of what treatments are effective. She recently had a flare-up after eating sushi. She reports no discharge or itching, describing the sensation as irritation. She typically develops a yeast infection after an allergy flare-up, which she treats with Diflucan. She has a few days left of her current prescription.    At her last visit in 08/2024, she had lost about 7 or 8 pounds down to 156 pounds. She has lost an additional 2 pounds since then.    Results      Lab Results   Component Value Date    WBC 5.53 09/11/2024    HGB 12.7 09/11/2024    HCT 39.7 09/11/2024    MCV 92.1 09/11/2024     09/11/2024     Lab Results   Component Value  Date    GLUCOSE 84 09/11/2024    BUN 10 09/11/2024    CREATININE 0.85 09/11/2024    EGFR 85.2 09/11/2024    BCR 11.8 09/11/2024    K 4.0 09/11/2024    CO2 23.2 09/11/2024    CALCIUM 9.3 09/11/2024    ALBUMIN 3.8 09/11/2024    BILITOT 0.4 09/11/2024    AST 22 09/11/2024    ALT 8 09/11/2024     Lab Results   Component Value Date    CHOL 172 09/01/2023    TRIG 72 09/01/2023    HDL 63 (H) 09/01/2023    LDL 95 09/01/2023     Lab Results   Component Value Date    TSH 0.447 09/01/2023     A1C Last 3 Results          1/12/2024    08:54   HGBA1C Last 3 Results   Hemoglobin A1C 5.2        The following portions of the patient's history were reviewed and updated as appropriate: allergies, current medications, past family history, past medical history, past social history, past surgical history and problem list.              Outpatient Medications Marked as Taking for the 12/2/24 encounter (Office Visit) with Cheryl Velasquez APRN   Medication Sig Dispense Refill    albuterol sulfate  (90 Base) MCG/ACT inhaler Inhale 2 puffs Every 4 (Four) Hours As Needed for Wheezing. 18 g 1    buPROPion XL (WELLBUTRIN XL) 300 MG 24 hr tablet Take 1 tablet by mouth Daily. 30 tablet 0    cyclobenzaprine (FLEXERIL) 5 MG tablet Take 1 tablet by mouth 3 (Three) Times a Day As Needed for Muscle Spasms. 15 tablet 0    dicyclomine (BENTYL) 20 MG tablet Take 1 tablet by mouth As Needed.      EPINEPHrine (EPIPEN) 0.3 MG/0.3ML solution auto-injector injection Inject 0.3 mL into the appropriate muscle as directed by prescriber 1 (One) Time As Needed.      famotidine (PEPCID) 20 MG tablet Take 1 tablet by mouth 2 (Two) Times a Day.      fexofenadine (ALLEGRA) 60 MG tablet Take 1 tablet by mouth Daily. 30 tablet 0    fluconazole (DIFLUCAN) 100 MG tablet Take 1 tablet by mouth Daily.      hydroCHLOROthiazide 25 MG tablet Take 1 tablet by mouth Daily. 30 tablet 0    hydrOXYzine (ATARAX) 10 MG tablet Take 1 tablet by mouth 2 (Two) Times a Day As  Needed for Anxiety.      metoprolol tartrate (LOPRESSOR) 25 MG tablet TAKE 1 TABLET BY MOUTH TWICE DAILY 180 tablet 0    Naltrexone powder Use 1 Units Daily. Naltrexone 100% powder Compounded 2mg low dose naltrexone compounded lactose free 2mg, 1 po qd Dispense quantity 30 gram with 3 refills 30 g 4    promethazine (PHENERGAN) 25 MG tablet Take 1 tablet by mouth Every 6 (Six) Hours As Needed for Nausea or Vomiting. 40 tablet 4    Semaglutide-Weight Management (Wegovy) 1.7 MG/0.75ML solution auto-injector Inject 0.75 mL under the skin into the appropriate area as directed Every 7 (Seven) Days. 3 mL 3    traZODone (DESYREL) 50 MG tablet TAKE 1 TO 2 TABLETS BY MOUTH EVERY NIGHT AT BEDTIME 60 tablet 1    Ubrelvy 100 MG tablet Take 1 tablet by mouth 2 (Two) Times a Day As Needed.      [DISCONTINUED] Naltrexone powder Use 1 Units Daily. Naltrexone 100% powder   Compounded  2mg low dose naltrexone compounded lactose free  2mg, 1 po qd  Dispense quantity 30 gram with 3 refills 30 g 4    [DISCONTINUED] Naltrexone powder Use 1 Units Daily. Naltrexone 100% powder   Compounded  2mg low dose naltrexone compounded lactose free  2mg, 1 po qd  Dispense quantity 30 gram with 3 refills 30 g 4     Allergies   Allergen Reactions    Dog Epithelium (Canis Lupus Familiaris) Hives and Itching    Prunus Persica Hives and Itching    Shellfish Allergy Anaphylaxis     Coughing, hives, carry Epipen    Alimentum Hives    Lemon Oil Hives    Mangifera Indica Hives    Pineapple Hives    Tomato Hives    Toradol [Ketorolac Tromethamine] Unknown - High Severity     Previous reaction with anesthesia    Amoxicillin Hives    Baclofen Other (See Comments)     Nervousness/jitters    Peanut Oil Itching    Ascorbate Rash    Strawberry Itching and Rash           Objective     Vitals:    12/02/24 1313   BP: 104/70   BP Location: Right arm   Patient Position: Sitting   Cuff Size: Adult   Pulse: 68   Resp: 14   Temp: 97.8 °F (36.6 °C)   TempSrc: Infrared   SpO2:  "99%   Weight: 70 kg (154 lb 6.4 oz)   Height: 62 cm (24.41\")     Body mass index is 182.14 kg/m².  Wt Readings from Last 3 Encounters:   12/02/24 70 kg (154 lb 6.4 oz)   11/01/24 69 kg (152 lb 3.2 oz)   09/11/24 69.9 kg (154 lb)       Physical Exam  Vitals and nursing note reviewed.   Constitutional:       General: She is not in acute distress.     Appearance: Normal appearance. She is well-developed. She is not diaphoretic.   HENT:      Head: Normocephalic and atraumatic.   Eyes:      General: No scleral icterus.     Conjunctiva/sclera: Conjunctivae normal.   Neck:      Vascular: No JVD.   Cardiovascular:      Rate and Rhythm: Normal rate and regular rhythm.      Chest Wall: PMI is not displaced. No thrill.      Heart sounds: Normal heart sounds. No murmur heard.  Pulmonary:      Effort: Pulmonary effort is normal. No respiratory distress.      Breath sounds: Normal breath sounds.   Abdominal:      General: Bowel sounds are normal. There is no distension.      Palpations: Abdomen is soft.      Tenderness: There is no abdominal tenderness.   Musculoskeletal:         General: Normal range of motion.      Cervical back: Normal range of motion.      Right lower leg: No edema.      Left lower leg: No edema.   Skin:     General: Skin is warm and dry.      Coloration: Skin is not pale.      Findings: No erythema or rash.   Neurological:      General: No focal deficit present.      Mental Status: She is alert and oriented to person, place, and time.      Coordination: Coordination normal.   Psychiatric:         Attention and Perception: Attention normal.         Mood and Affect: Mood and affect normal.         Behavior: Behavior normal. Behavior is cooperative.         Thought Content: Thought content normal.         Cognition and Memory: Cognition normal.         Judgment: Judgment normal.         Assessment & Plan   Diagnoses and all orders for this visit:    1. Primary hypertension (Primary)    2. Palpitations    3. " Arthralgia, unspecified joint  -     Discontinue: Naltrexone powder; Use 1 Units Daily. Naltrexone 100% powder   Compounded  2mg low dose naltrexone compounded lactose free  2mg, 1 po qd  Dispense quantity 30 gram with 3 refills  Dispense: 30 g; Refill: 4  -     Naltrexone powder; Use 1 Units Daily. Naltrexone 100% powder Compounded 2mg low dose naltrexone compounded lactose free 2mg, 1 po qd Dispense quantity 30 gram with 3 refills  Dispense: 30 g; Refill: 4    4. KEVIN (generalized anxiety disorder)    5. Fibromyalgia    6. Anxiety    7. Depressive disorder        Assessment & Plan  1. Hypertension.  Her metoprolol dosage was previously adjusted to a full pill at night and a half pill in the morning due to palpitations. She reported that the palpitations have improved but not completely resolved. She was advised to monitor her palpitations closely and increase her nighttime dose of metoprolol to a full pill if they recur. She was also advised to reduce caffeine intake in the evenings.    2. Palpitations.  She was advised to monitor her palpitations closely and increase her nighttime dose of metoprolol to a full pill if they recur. She was also advised to reduce caffeine intake in the evenings.    3. Obesity.  She has lost an additional 2 pounds since her last visit in August 2024, bringing her weight down to 156 pounds. She was informed that if her gastric emptying is significantly delayed, discontinuation of Wegovy would be necessary. She was instructed to inform the provider of any such delay before her next visit. A nuclear scan of her abdomen is scheduled for 12/10/2024 to assess gastric emptying.    4. Fibromyalgia.  A prescription for naltrexone powder 2 mg, low dose, lactose-free, 1 unit daily, will be provided. She was also given a refill for cyclobenzaprine, which she uses for fibromyalgia and previous TMJ issues.    5. Anxiety and depression.  Her current treatment regimen with bupropion will be maintained  as she feels it is manageable. She expressed concerns about making changes due to the time of year and personal triggers.    6. Vaginal irritation.  She was advised to try a barrier cream such as Desitin or A and D, or Gold Bond powder. She was also advised to keep the area clean and dry, especially after exercising or sweating. If symptoms persist, a yeast powder like nystatin may be considered.    Follow-up  Return in 3 months for follow up.            Return in about 3 months (around 3/2/2025) for Annual.    I discussed my findings,recommendations, and plan of care was with the patient. They verbalized understanding and agreement.  Patient was encouraged to keep me informed of any acute changes, lack of improvement, or any new concerning symptoms.     Patient or patient representative verbalized consent for the use of Ambient Listening during the visit with  RIP Tillman for chart documentation. 12/2/2024  16:02 EST

## 2025-01-08 DIAGNOSIS — F41.1 GAD (GENERALIZED ANXIETY DISORDER): ICD-10-CM

## 2025-01-08 RX ORDER — BUPROPION HYDROCHLORIDE 300 MG/1
300 TABLET ORAL DAILY
Qty: 90 TABLET | Refills: 0 | Status: SHIPPED | OUTPATIENT
Start: 2025-01-08

## 2025-01-08 NOTE — TELEPHONE ENCOUNTER
Rx Refill Note  Requested Prescriptions     Pending Prescriptions Disp Refills    buPROPion XL (WELLBUTRIN XL) 300 MG 24 hr tablet [Pharmacy Med Name: BUPROPION XL 300MG TABLETS] 90 tablet 0     Sig: TAKE 1 TABLET BY MOUTH DAILY      Last office visit with prescribing clinician: 12/2/2024     Next office visit with prescribing clinician: 3/10/2025       Stefany Tolbert  01/08/25, 11:34 EST

## 2025-01-22 ENCOUNTER — PATIENT MESSAGE (OUTPATIENT)
Dept: INTERNAL MEDICINE | Facility: CLINIC | Age: 50
End: 2025-01-22
Payer: COMMERCIAL

## 2025-01-30 ENCOUNTER — APPOINTMENT (OUTPATIENT)
Dept: MRI IMAGING | Facility: HOSPITAL | Age: 50
End: 2025-01-30
Payer: COMMERCIAL

## 2025-01-30 ENCOUNTER — APPOINTMENT (OUTPATIENT)
Dept: GENERAL RADIOLOGY | Facility: HOSPITAL | Age: 50
End: 2025-01-30
Payer: COMMERCIAL

## 2025-01-30 ENCOUNTER — OFFICE VISIT (OUTPATIENT)
Dept: INTERNAL MEDICINE | Facility: CLINIC | Age: 50
End: 2025-01-30
Payer: COMMERCIAL

## 2025-01-30 ENCOUNTER — APPOINTMENT (OUTPATIENT)
Dept: CT IMAGING | Facility: HOSPITAL | Age: 50
End: 2025-01-30
Payer: COMMERCIAL

## 2025-01-30 ENCOUNTER — HOSPITAL ENCOUNTER (EMERGENCY)
Facility: HOSPITAL | Age: 50
Discharge: HOME | End: 2025-01-30
Attending: EMERGENCY MEDICINE | Admitting: EMERGENCY MEDICINE
Payer: COMMERCIAL

## 2025-01-30 VITALS
RESPIRATION RATE: 18 BRPM | WEIGHT: 162.04 LBS | OXYGEN SATURATION: 100 % | BODY MASS INDEX: 29.82 KG/M2 | TEMPERATURE: 98.7 F | HEART RATE: 52 BPM | SYSTOLIC BLOOD PRESSURE: 123 MMHG | HEIGHT: 62 IN | DIASTOLIC BLOOD PRESSURE: 79 MMHG

## 2025-01-30 VITALS
TEMPERATURE: 98 F | HEART RATE: 50 BPM | SYSTOLIC BLOOD PRESSURE: 114 MMHG | DIASTOLIC BLOOD PRESSURE: 70 MMHG | WEIGHT: 162 LBS | BODY MASS INDEX: 29.81 KG/M2 | OXYGEN SATURATION: 98 % | HEIGHT: 62 IN

## 2025-01-30 DIAGNOSIS — G43.409 HEMIPLEGIC MIGRAINE WITHOUT STATUS MIGRAINOSUS, NOT INTRACTABLE: Primary | ICD-10-CM

## 2025-01-30 DIAGNOSIS — G44.52 NEW DAILY PERSISTENT HEADACHE: ICD-10-CM

## 2025-01-30 DIAGNOSIS — R29.898 RIGHT HAND WEAKNESS: Primary | ICD-10-CM

## 2025-01-30 LAB
ANION GAP SERPL CALCULATED.3IONS-SCNC: 9 MMOL/L (ref 5–15)
APTT PPP: 25.1 SECONDS (ref 22–39)
BASOPHILS # BLD AUTO: 0.01 10*3/MM3 (ref 0–0.2)
BASOPHILS NFR BLD AUTO: 0.2 % (ref 0–1.5)
BUN SERPL-MCNC: 12 MG/DL (ref 6–20)
BUN/CREAT SERPL: 11.7 (ref 7–25)
CALCIUM SPEC-SCNC: 9.4 MG/DL (ref 8.6–10.5)
CHLORIDE SERPL-SCNC: 99 MMOL/L (ref 98–107)
CO2 SERPL-SCNC: 28 MMOL/L (ref 22–29)
CREAT SERPL-MCNC: 1.03 MG/DL (ref 0.57–1)
DEPRECATED RDW RBC AUTO: 43 FL (ref 37–54)
EGFRCR SERPLBLD CKD-EPI 2021: 67.6 ML/MIN/1.73
EOSINOPHIL # BLD AUTO: 0.05 10*3/MM3 (ref 0–0.4)
EOSINOPHIL NFR BLD AUTO: 1.1 % (ref 0.3–6.2)
ERYTHROCYTE [DISTWIDTH] IN BLOOD BY AUTOMATED COUNT: 12.7 % (ref 12.3–15.4)
GLUCOSE SERPL-MCNC: 78 MG/DL (ref 65–99)
HCT VFR BLD AUTO: 39.6 % (ref 34–46.6)
HGB BLD-MCNC: 12.6 G/DL (ref 12–15.9)
HOLD SPECIMEN: NORMAL
IMM GRANULOCYTES # BLD AUTO: 0.02 10*3/MM3 (ref 0–0.05)
IMM GRANULOCYTES NFR BLD AUTO: 0.4 % (ref 0–0.5)
INR PPP: 1 (ref 0.89–1.12)
LYMPHOCYTES # BLD AUTO: 1.76 10*3/MM3 (ref 0.7–3.1)
LYMPHOCYTES NFR BLD AUTO: 39 % (ref 19.6–45.3)
MCH RBC QN AUTO: 29.6 PG (ref 26.6–33)
MCHC RBC AUTO-ENTMCNC: 31.8 G/DL (ref 31.5–35.7)
MCV RBC AUTO: 93 FL (ref 79–97)
MONOCYTES # BLD AUTO: 0.36 10*3/MM3 (ref 0.1–0.9)
MONOCYTES NFR BLD AUTO: 8 % (ref 5–12)
NEUTROPHILS NFR BLD AUTO: 2.31 10*3/MM3 (ref 1.7–7)
NEUTROPHILS NFR BLD AUTO: 51.3 % (ref 42.7–76)
NRBC BLD AUTO-RTO: 0 /100 WBC (ref 0–0.2)
PLATELET # BLD AUTO: 319 10*3/MM3 (ref 140–450)
PMV BLD AUTO: 10.4 FL (ref 6–12)
POTASSIUM SERPL-SCNC: 3.6 MMOL/L (ref 3.5–5.2)
PROTHROMBIN TIME: 13.3 SECONDS (ref 12.2–14.5)
QT INTERVAL: 426 MS
QTC INTERVAL: 384 MS
RBC # BLD AUTO: 4.26 10*6/MM3 (ref 3.77–5.28)
SODIUM SERPL-SCNC: 136 MMOL/L (ref 136–145)
WBC NRBC COR # BLD AUTO: 4.51 10*3/MM3 (ref 3.4–10.8)
WHOLE BLOOD HOLD COAG: NORMAL
WHOLE BLOOD HOLD SPECIMEN: NORMAL

## 2025-01-30 PROCEDURE — 70498 CT ANGIOGRAPHY NECK: CPT

## 2025-01-30 PROCEDURE — 70553 MRI BRAIN STEM W/O & W/DYE: CPT

## 2025-01-30 PROCEDURE — 96366 THER/PROPH/DIAG IV INF ADDON: CPT

## 2025-01-30 PROCEDURE — 85014 HEMATOCRIT: CPT

## 2025-01-30 PROCEDURE — 71045 X-RAY EXAM CHEST 1 VIEW: CPT

## 2025-01-30 PROCEDURE — 70450 CT HEAD/BRAIN W/O DYE: CPT

## 2025-01-30 PROCEDURE — 0042T HC CT CEREBRAL PERFUSION W/WO CONTRAST: CPT

## 2025-01-30 PROCEDURE — 36415 COLL VENOUS BLD VENIPUNCTURE: CPT

## 2025-01-30 PROCEDURE — 96375 TX/PRO/DX INJ NEW DRUG ADDON: CPT

## 2025-01-30 PROCEDURE — 25010000002 DIPHENHYDRAMINE PER 50 MG

## 2025-01-30 PROCEDURE — 25510000001 IOPAMIDOL PER 1 ML: Performed by: EMERGENCY MEDICINE

## 2025-01-30 PROCEDURE — 99285 EMERGENCY DEPT VISIT HI MDM: CPT

## 2025-01-30 PROCEDURE — 99283 EMERGENCY DEPT VISIT LOW MDM: CPT

## 2025-01-30 PROCEDURE — 80048 BASIC METABOLIC PNL TOTAL CA: CPT | Performed by: EMERGENCY MEDICINE

## 2025-01-30 PROCEDURE — 85025 COMPLETE CBC W/AUTO DIFF WBC: CPT | Performed by: EMERGENCY MEDICINE

## 2025-01-30 PROCEDURE — 25010000002 PROCHLORPERAZINE 10 MG/2ML SOLUTION

## 2025-01-30 PROCEDURE — 25810000003 SODIUM CHLORIDE 0.9 % SOLUTION

## 2025-01-30 PROCEDURE — 85610 PROTHROMBIN TIME: CPT | Performed by: EMERGENCY MEDICINE

## 2025-01-30 PROCEDURE — A9577 INJ MULTIHANCE: HCPCS | Performed by: EMERGENCY MEDICINE

## 2025-01-30 PROCEDURE — 70496 CT ANGIOGRAPHY HEAD: CPT

## 2025-01-30 PROCEDURE — 25010000002 MAGNESIUM SULFATE IN D5W 1G/100ML (PREMIX) 1-5 GM/100ML-% SOLUTION

## 2025-01-30 PROCEDURE — 85730 THROMBOPLASTIN TIME PARTIAL: CPT | Performed by: EMERGENCY MEDICINE

## 2025-01-30 PROCEDURE — 80047 BASIC METABLC PNL IONIZED CA: CPT

## 2025-01-30 PROCEDURE — 96365 THER/PROPH/DIAG IV INF INIT: CPT

## 2025-01-30 PROCEDURE — 93005 ELECTROCARDIOGRAM TRACING: CPT | Performed by: EMERGENCY MEDICINE

## 2025-01-30 PROCEDURE — 25510000002 GADOBENATE DIMEGLUMINE 529 MG/ML SOLUTION: Performed by: EMERGENCY MEDICINE

## 2025-01-30 PROCEDURE — 99213 OFFICE O/P EST LOW 20 MIN: CPT | Performed by: FAMILY MEDICINE

## 2025-01-30 RX ORDER — MAGNESIUM SULFATE 1 G/100ML
1 INJECTION INTRAVENOUS ONCE
Status: COMPLETED | OUTPATIENT
Start: 2025-01-30 | End: 2025-01-30

## 2025-01-30 RX ORDER — DIPHENHYDRAMINE HYDROCHLORIDE 50 MG/ML
25 INJECTION INTRAMUSCULAR; INTRAVENOUS ONCE
Status: COMPLETED | OUTPATIENT
Start: 2025-01-30 | End: 2025-01-30

## 2025-01-30 RX ORDER — SODIUM CHLORIDE 0.9 % (FLUSH) 0.9 %
10 SYRINGE (ML) INJECTION AS NEEDED
Status: DISCONTINUED | OUTPATIENT
Start: 2025-01-30 | End: 2025-01-30 | Stop reason: HOSPADM

## 2025-01-30 RX ORDER — ATOGEPANT 60 MG/1
60 TABLET ORAL DAILY
COMMUNITY
Start: 2025-01-22

## 2025-01-30 RX ORDER — IOPAMIDOL 755 MG/ML
100 INJECTION, SOLUTION INTRAVASCULAR
Status: COMPLETED | OUTPATIENT
Start: 2025-01-30 | End: 2025-01-30

## 2025-01-30 RX ORDER — ONDANSETRON 4 MG/1
TABLET, FILM COATED ORAL
COMMUNITY
Start: 2024-11-20

## 2025-01-30 RX ORDER — PROCHLORPERAZINE EDISYLATE 5 MG/ML
10 INJECTION INTRAMUSCULAR; INTRAVENOUS ONCE
Status: COMPLETED | OUTPATIENT
Start: 2025-01-30 | End: 2025-01-30

## 2025-01-30 RX ADMIN — PROCHLORPERAZINE EDISYLATE 10 MG: 5 INJECTION INTRAMUSCULAR; INTRAVENOUS at 14:38

## 2025-01-30 RX ADMIN — SODIUM CHLORIDE 1000 ML: 9 INJECTION, SOLUTION INTRAVENOUS at 14:38

## 2025-01-30 RX ADMIN — GADOBENATE DIMEGLUMINE 15 ML: 529 INJECTION, SOLUTION INTRAVENOUS at 16:31

## 2025-01-30 RX ADMIN — IOPAMIDOL 120 ML: 755 INJECTION, SOLUTION INTRAVENOUS at 14:12

## 2025-01-30 RX ADMIN — DIPHENHYDRAMINE HYDROCHLORIDE 25 MG: 50 INJECTION INTRAMUSCULAR; INTRAVENOUS at 14:38

## 2025-01-30 RX ADMIN — MAGNESIUM SULFATE 1 G: 1 INJECTION INTRAVENOUS at 14:41

## 2025-01-30 NOTE — ED PROVIDER NOTES
EMERGENCY DEPARTMENT ENCOUNTER    Pt Name: Stacia Gomez  MRN: [id number]  Pt :   1975  Room Number:    Date of encounter:  2025  PCP: Cheryl Velasquez APRN  ED Provider: Gibran Rangel MD    Historian: Patient      HPI:  Chief Complaint: Right-sided numbness and weakness        Context: Stacia Gomez is a 49 y.o. female who presents to the ED c/o acute right-sided numbness and weakness first noticed this morning upon waking.  The patient's last known well was last night at around 10 PM.  She went to bed without symptoms other than feeling generally unwell.  The patient reviewed ports a moderate headache along with right arm and leg numbness and weakness.  She does have a migraine history.  No prior history of CVA.  No anticoagulants.      PAST MEDICAL HISTORY  Past Medical History:   Diagnosis Date    Anxiety     Asthma     as a child    Fibromyalgia, primary     GERD (gastroesophageal reflux disease)     Headache     History of medical problems     Meineires Disease    HL (hearing loss)     Inflammatory bowel disease     Palpitation          PAST SURGICAL HISTORY  Past Surgical History:   Procedure Laterality Date    CHOLECYSTECTOMY  2016    COLONOSCOPY  2023    ENDOMETRIAL ABLATION      HYSTERECTOMY  2014    OVARIAN CYST REMOVAL Left 2024    TUBAL ABDOMINAL LIGATION  1999    TUBAL ABDOMINAL LIGATION Left 2024         FAMILY HISTORY  Family History   Problem Relation Age of Onset    Arthritis Mother     Hyperlipidemia Mother     Vision loss Mother     Heart disease Maternal Grandmother     Cancer Sister         Breast cancee         SOCIAL HISTORY  Social History     Socioeconomic History    Marital status:    Tobacco Use    Smoking status: Never     Passive exposure: Never    Smokeless tobacco: Never   Vaping Use    Vaping status: Never Used   Substance and Sexual Activity    Alcohol use: Yes     Alcohol/week: 3.0 standard drinks of alcohol     Types: 1  Glasses of wine, 1 Shots of liquor, 1 Drinks containing 0.5 oz of alcohol per week    Drug use: Never    Sexual activity: Yes     Partners: Male     Birth control/protection: Tubal ligation, Hysterectomy         ALLERGIES  Dog epithelium (canis lupus familiaris), Prunus persica, Shellfish allergy, Alimentum, Lemon oil, Mangifera indica, Pineapple, Tomato, Toradol [ketorolac tromethamine], Amoxicillin, Baclofen, Peanut oil, Ascorbate, and Strawberry        REVIEW OF SYSTEMS  Review of Systems       All systems reviewed and negative except for those discussed in HPI.       PHYSICAL EXAM    I have reviewed the triage vital signs and nursing notes.    ED Triage Vitals [01/30/25 1322]   Temp Heart Rate Resp BP SpO2   98.7 °F (37.1 °C) 54 18 140/86 100 %      Temp src Heart Rate Source Patient Position BP Location FiO2 (%)   Oral Monitor Sitting Right arm --       Physical Exam  GENERAL:   Appears in no acute distress.  I greet her shortly after she is sent to our provider in triage area for evaluation.  I accompanied her to the CT scan #1 for rapid evaluation of this code stroke patient.  HENT: Nares patent.  No facial asymmetry slightly dry mucous membranes.  No facial asymmetry.  EYES: No scleral icterus.  CV: Regular rhythm, regular rate.  No murmurs gallops rubs  RESPIRATORY: Normal effort.  No audible wheezes, rales or rhonchi.  Clear to auscultation  ABDOMEN: Soft, nontender  MUSCULOSKELETAL: No deformities.   NEURO: Alert, moves all extremities, follows commands.  Cranial nerves intact.  Motor and sensory intact in upper and lower extremities.  See stroke navigator note for detailed NIH stroke score  SKIN: Warm, dry, no rash visualized.      LAB RESULTS  Recent Results (from the past 24 hours)   Protime-INR    Collection Time: 01/30/25  2:01 PM    Specimen: Blood   Result Value Ref Range    Protime 13.3 12.2 - 14.5 Seconds    INR 1.00 0.89 - 1.12   aPTT    Collection Time: 01/30/25  2:01 PM    Specimen: Blood    Result Value Ref Range    PTT 25.1 22.0 - 39.0 seconds   Green Top (Gel)    Collection Time: 01/30/25  2:01 PM   Result Value Ref Range    Extra Tube Hold for add-ons.    Lavender Top    Collection Time: 01/30/25  2:01 PM   Result Value Ref Range    Extra Tube hold for add-on    Gold Top - SST    Collection Time: 01/30/25  2:01 PM   Result Value Ref Range    Extra Tube Hold for add-ons.    Gray Top    Collection Time: 01/30/25  2:01 PM   Result Value Ref Range    Extra Tube Hold for add-ons.    Light Blue Top    Collection Time: 01/30/25  2:01 PM   Result Value Ref Range    Extra Tube Hold for add-ons.    CBC Auto Differential    Collection Time: 01/30/25  2:01 PM    Specimen: Blood   Result Value Ref Range    WBC 4.51 3.40 - 10.80 10*3/mm3    RBC 4.26 3.77 - 5.28 10*6/mm3    Hemoglobin 12.6 12.0 - 15.9 g/dL    Hematocrit 39.6 34.0 - 46.6 %    MCV 93.0 79.0 - 97.0 fL    MCH 29.6 26.6 - 33.0 pg    MCHC 31.8 31.5 - 35.7 g/dL    RDW 12.7 12.3 - 15.4 %    RDW-SD 43.0 37.0 - 54.0 fl    MPV 10.4 6.0 - 12.0 fL    Platelets 319 140 - 450 10*3/mm3    Neutrophil % 51.3 42.7 - 76.0 %    Lymphocyte % 39.0 19.6 - 45.3 %    Monocyte % 8.0 5.0 - 12.0 %    Eosinophil % 1.1 0.3 - 6.2 %    Basophil % 0.2 0.0 - 1.5 %    Immature Grans % 0.4 0.0 - 0.5 %    Neutrophils, Absolute 2.31 1.70 - 7.00 10*3/mm3    Lymphocytes, Absolute 1.76 0.70 - 3.10 10*3/mm3    Monocytes, Absolute 0.36 0.10 - 0.90 10*3/mm3    Eosinophils, Absolute 0.05 0.00 - 0.40 10*3/mm3    Basophils, Absolute 0.01 0.00 - 0.20 10*3/mm3    Immature Grans, Absolute 0.02 0.00 - 0.05 10*3/mm3    nRBC 0.0 0.0 - 0.2 /100 WBC   Basic Metabolic Panel    Collection Time: 01/30/25  2:01 PM    Specimen: Blood   Result Value Ref Range    Glucose 78 65 - 99 mg/dL    BUN 12 6 - 20 mg/dL    Creatinine 1.03 (H) 0.57 - 1.00 mg/dL    Sodium 136 136 - 145 mmol/L    Potassium 3.6 3.5 - 5.2 mmol/L    Chloride 99 98 - 107 mmol/L    CO2 28.0 22.0 - 29.0 mmol/L    Calcium 9.4 8.6 - 10.5 mg/dL     BUN/Creatinine Ratio 11.7 7.0 - 25.0    Anion Gap 9.0 5.0 - 15.0 mmol/L    eGFR 67.6 >60.0 mL/min/1.73   ECG 12 Lead Stroke Evaluation    Collection Time: 01/30/25  4:49 PM   Result Value Ref Range    QT Interval 426 ms    QTC Interval 384 ms       If labs were ordered, I independently reviewed the results and considered them in treating the patient.        RADIOLOGY  MRI Brain With & Without Contrast    Result Date: 1/30/2025  MRI BRAIN W WO CONTRAST Date of Exam: 1/30/2025 3:58 PM EST Indication: Right-sided numbness and weakness, headache, MRI in 2022 that could not rule out demyelinating process.  Comparison: Head CT, CT perfusion, CTA head neck 1/30/2025 Technique:  Routine multiplanar/multisequence sequence images of the brain were obtained before and after the uneventful administration of 15 mL Multihance. Findings: No acute midline shift, extra axial fluid collection, hydrocephalus, or infarct. No subacute to old hemorrhage. Brain volume appears age appropriate. A few scattered frontal predominant areas of T2/FLAIR hyperintensity in the subcortical white matter, nonspecific, but can be seen with chronic migraines or perhaps from early small vessel ischemic/hypertensive changes. No specific pattern/distribution strongly suggestive of a demyelinating process. No abnormal enhancement in the brain or its coverings. Appropriate flow voids at the skull base and in the major venous sinuses. Mild mucosal changes in the paranasal sinuses. Mastoid air cells are essentially clear. Visualized globes and orbits appear unremarkable by MRI. No acute or aggressive appearing bone or extracranial soft tissue process.     Impression: No acute intracranial finding. No abnormal intracranial enhancement. A few scattered frontal predominant areas of T2/FLAIR hyperintensity in the subcortical white matter, nonspecific, but can be seen with chronic migraines or perhaps from early small vessel ischemic/hypertensive changes. No  specific pattern/distribution strongly suggestive of a demyelinating process. Electronically Signed: Matthias SHELDON Caro  1/30/2025 4:42 PM EST  Workstation ID: [id number]    XR Chest 1 View    Result Date: 1/30/2025  XR CHEST 1 VW Date of Exam: 1/30/2025 2:45 PM EST Indication: Acute Stroke Protocol (onset < 12 hrs) Comparison: Two-view chest x-ray 7/7/2009, CTA neck 1/30/2025. Findings: There is a calcified granuloma in the right upper lung. Lungs otherwise appear clear. No pneumothorax or large pleural effusion is seen. Cardiomediastinal contours appear within normal limits.     Impression: No acute cardiopulmonary abnormality is identified. Electronically Signed: Claudia Yudith  1/30/2025 3:03 PM EST  Workstation ID: [id number]    CT Angiogram Head w AI Analysis of LVO    Result Date: 1/30/2025  CT ANGIOGRAM HEAD W AI ANALYSIS OF LVO, CT CEREBRAL PERFUSION W WO CONTRAST, CT ANGIOGRAM NECK Date of Exam: 1/30/2025 1:49 PM EST Indication: Neuro deficit, acute, stroke suspected Neuro deficit, acute stroke suspected. Comparison: Noncontrast head CT from today Technique: CTA of the head was performed after the uneventful intravenous administration of 120 mL Isovue-370. Reconstructed coronal and sagittal images were also obtained. In addition, a 3-D volume rendered image was created for interpretation. Automated exposure control and iterative reconstruction methods were used. FINDINGS: Vascular Findings: The right common carotid, internal carotid, middle cerebral, anterior cerebral, vertebral, and posterior cerebral arteries are patent without abrupt cut off or aneurysmal dilation. The left common carotid, internal carotid, middle cerebral, anterior cerebral, vertebral, and posterior cerebral arteries are patent without abrupt cut off or aneurysmal dilation. Basilar artery appears patent and appears unremarkable. Non-vascular Findings: Filling defect within the right transverse sinus is thought to represent prominent arachnoid  granulation. Description of nonvascular intracranial findings, please refer to the noncontrast head CT performed the same date. No acute abnormality is identified within the visualized soft tissue or bony structures of the neck. Venous air in the left infraclavicular region likely related to IV access. The visualized lung apices are clear. Right upper lobe granuloma. Calcified paratracheal lymph nodes noted. CT Perfusion: CBF (<30%) volume: 0 mL Tmax (>6.0s) volume: 0 mL Mismatch volume: 0 mL Mismatch ratio: None     1.No acute abnormality is identified within the large arteries of the head or neck. 2.No significant stenosis of the bilateral internal carotid arteries. 3.CT perfusion study demonstrates no territorial ischemia or core infarct. 4.Additional findings as detailed above. Electronically Signed: Jann Easley MD  1/30/2025 2:24 PM EST  Workstation ID: [id number]    CT Angiogram Neck    Result Date: 1/30/2025  CT ANGIOGRAM HEAD W AI ANALYSIS OF LVO, CT CEREBRAL PERFUSION W WO CONTRAST, CT ANGIOGRAM NECK Date of Exam: 1/30/2025 1:49 PM EST Indication: Neuro deficit, acute, stroke suspected Neuro deficit, acute stroke suspected. Comparison: Noncontrast head CT from today Technique: CTA of the head was performed after the uneventful intravenous administration of 120 mL Isovue-370. Reconstructed coronal and sagittal images were also obtained. In addition, a 3-D volume rendered image was created for interpretation. Automated exposure control and iterative reconstruction methods were used. FINDINGS: Vascular Findings: The right common carotid, internal carotid, middle cerebral, anterior cerebral, vertebral, and posterior cerebral arteries are patent without abrupt cut off or aneurysmal dilation. The left common carotid, internal carotid, middle cerebral, anterior cerebral, vertebral, and posterior cerebral arteries are patent without abrupt cut off or aneurysmal dilation. Basilar artery appears patent and appears  unremarkable. Non-vascular Findings: Filling defect within the right transverse sinus is thought to represent prominent arachnoid granulation. Description of nonvascular intracranial findings, please refer to the noncontrast head CT performed the same date. No acute abnormality is identified within the visualized soft tissue or bony structures of the neck. Venous air in the left infraclavicular region likely related to IV access. The visualized lung apices are clear. Right upper lobe granuloma. Calcified paratracheal lymph nodes noted. CT Perfusion: CBF (<30%) volume: 0 mL Tmax (>6.0s) volume: 0 mL Mismatch volume: 0 mL Mismatch ratio: None     1.No acute abnormality is identified within the large arteries of the head or neck. 2.No significant stenosis of the bilateral internal carotid arteries. 3.CT perfusion study demonstrates no territorial ischemia or core infarct. 4.Additional findings as detailed above. Electronically Signed: Jann Easley MD  1/30/2025 2:24 PM EST  Workstation ID: [id number]    CT CEREBRAL PERFUSION WITH & WITHOUT CONTRAST    Result Date: 1/30/2025  CT ANGIOGRAM HEAD W AI ANALYSIS OF LVO, CT CEREBRAL PERFUSION W WO CONTRAST, CT ANGIOGRAM NECK Date of Exam: 1/30/2025 1:49 PM EST Indication: Neuro deficit, acute, stroke suspected Neuro deficit, acute stroke suspected. Comparison: Noncontrast head CT from today Technique: CTA of the head was performed after the uneventful intravenous administration of 120 mL Isovue-370. Reconstructed coronal and sagittal images were also obtained. In addition, a 3-D volume rendered image was created for interpretation. Automated exposure control and iterative reconstruction methods were used. FINDINGS: Vascular Findings: The right common carotid, internal carotid, middle cerebral, anterior cerebral, vertebral, and posterior cerebral arteries are patent without abrupt cut off or aneurysmal dilation. The left common carotid, internal carotid, middle cerebral,  anterior cerebral, vertebral, and posterior cerebral arteries are patent without abrupt cut off or aneurysmal dilation. Basilar artery appears patent and appears unremarkable. Non-vascular Findings: Filling defect within the right transverse sinus is thought to represent prominent arachnoid granulation. Description of nonvascular intracranial findings, please refer to the noncontrast head CT performed the same date. No acute abnormality is identified within the visualized soft tissue or bony structures of the neck. Venous air in the left infraclavicular region likely related to IV access. The visualized lung apices are clear. Right upper lobe granuloma. Calcified paratracheal lymph nodes noted. CT Perfusion: CBF (<30%) volume: 0 mL Tmax (>6.0s) volume: 0 mL Mismatch volume: 0 mL Mismatch ratio: None     1.No acute abnormality is identified within the large arteries of the head or neck. 2.No significant stenosis of the bilateral internal carotid arteries. 3.CT perfusion study demonstrates no territorial ischemia or core infarct. 4.Additional findings as detailed above. Electronically Signed: Jann Easley MD  1/30/2025 2:24 PM EST  Workstation ID: [id number]    CT Head Without Contrast Stroke Protocol    Result Date: 1/30/2025  CT HEAD WO CONTRAST STROKE PROTOCOL Date of Exam: 1/30/2025 1:45 PM EST Indication: Neuro deficit, acute, stroke suspected Neuro deficit, acute, stroke suspected. Comparison: Head CT 5/10/2024. Technique: Axial CT images were obtained of the head without contrast administration.  Reconstructed coronal images were also obtained. Automated exposure control and iterative construction methods were used. Results discussed in person with the stroke navigator at the time of the scan. Findings: No evidence of acute intracranial hemorrhage or mass effect. No extra-axial collection. The gray white matter differentiation is preserved. Ventricles and sulci are symmetric. The mastoid air cells and  paranasal sinuses are well aerated. Globes and extraocular muscles are unremarkable. No acute or suspicious osseous abnormality. Soft tissues within normal limits.     Impression: No acute intracranial findings. Electronically Signed: Efra Burnett MD  1/30/2025 1:55 PM EST  Workstation ID: [id number]     I ordered and independently reviewed the above noted radiographic studies.      I viewed images of CT head as well as MRI brain which showed no acute CVA per my independent interpretation.    See radiologist's dictation for official interpretation.        PROCEDURES    Critical Care    Performed by: Gibran Rangel MD  Authorized by: Gibran Rangel MD    Critical care provider statement:     Critical care time (minutes):  35    Critical care time was exclusive of:  Separately billable procedures and treating other patients    Critical care was necessary to treat or prevent imminent or life-threatening deterioration of the following conditions:  CNS failure or compromise    Critical care was time spent personally by me on the following activities:  Ordering and performing treatments and interventions, ordering and review of laboratory studies, ordering and review of radiographic studies, pulse oximetry, re-evaluation of patient's condition, review of old charts, obtaining history from patient or surrogate, examination of patient, evaluation of patient's response to treatment, discussions with consultants and development of treatment plan with patient or surrogate  Comments:      I attended to the patient very soon after arrival in the emergency department.  I evaluated her for possibility of large vessel occlusion CVA, small ischemic or hemorrhagic CVA, etc.  I considered use of IV TNKase thrombolytic but the patient was out of the 4.5-hour window.  Ultimately was able to discharge patient home but only after extensive workup.      ECG 12 Lead Stroke Evaluation   Final Result   Test Reason : Stroke Evaluation    Blood Pressure :   */*   mmHG   Vent. Rate :  49 BPM     Atrial Rate :  49 BPM      P-R Int : 132 ms          QRS Dur :  90 ms       QT Int : 426 ms       P-R-T Axes :  36  47   9 degrees     QTcB Int : 384 ms      Sinus bradycardia   Nonspecific T wave abnormality   Abnormal ECG   No previous ECGs available   Confirmed by SUKH ROSE MD ([id number]) on 1/30/2025 9:22:14 PM      Referred By: EDMD           Confirmed By: SUKH ROSE MD          MEDICATIONS GIVEN IN ER    Medications   sodium chloride 0.9 % flush 10 mL (has no administration in time range)   sodium chloride 0.9 % bolus 1,000 mL (0 mL Intravenous Stopped 1/30/25 1719)   prochlorperazine (COMPAZINE) injection 10 mg (10 mg Intravenous Given 1/30/25 1438)   diphenhydrAMINE (BENADRYL) injection 25 mg (25 mg Intravenous Given 1/30/25 1438)   magnesium sulfate in D5W 1g/100mL (PREMIX) (0 g Intravenous Stopped 1/30/25 1720)   iopamidol (ISOVUE-370) 76 % injection 100 mL (120 mL Intravenous Given 1/30/25 1412)   gadobenate dimeglumine (MULTIHANCE) injection 15 mL (15 mL Intravenous Given 1/30/25 1631)         MEDICAL DECISION MAKING, PROGRESS, and CONSULTS    All labs, if obtained, have been independently reviewed by me.  All radiology studies, if obtained, have been reviewed by me and the radiologist dictating the report.  All EKG's, if obtained, have been independently viewed and interpreted by me/my attending physician.      Discussion below represents my analysis of pertinent findings related to patient's condition, differential diagnosis, treatment plan and final disposition.                                          Differential diagnosis:    CVA versus TIA versus variant migraine, etc.      Additional sources:      - External (non-ED) record review:    I reviewed multiple records on this patient to include MRI brain 12/4/2020 which showed no acute intracranial process  MRI brain 2/22/2022 which showed mild bilateral deep white matter changes.    - Chronic  or social conditions impacting care: Lifelong non-smoker.  Consumes alcohol        Orders placed during this visit:  Orders Placed This Encounter   Procedures    Critical Care    CT Head Without Contrast Stroke Protocol    CT Angiogram Head w AI Analysis of LVO    CT Angiogram Neck    CT CEREBRAL PERFUSION WITH & WITHOUT CONTRAST    XR Chest 1 View    MRI Brain With & Without Contrast    [place] Draw    Protime-INR    aPTT    CBC Auto Differential    Basic Metabolic Panel    NPO Diet NPO Type: Strict NPO    Activate Code Stoke    Perform NIH Stroke Scale    Measure Actual Weight    Notify Provider SBP <80 or >200    Notify MD for SBP Greater Than 140 (For Hemorrhagic Stroke)    Head of Bed 30 Degrees or Less    Undress and Gown    Continuous Pulse Oximetry    Vital Signs    Neuro Checks    No Hypotonic Fluids    Nursing Dysphagia Screening (Complete Prior to Giving anything PO)    RN to Place Order SLP Consult (IF swallow screen failed) - Eval & Treat Choosing Reason of RN Dysphagia Screen Failed    Inpatient Neurology Consult Stroke    Oxygen Therapy- Nasal Cannula; Titrate 1-6 LPM Per SpO2; 90 - 95%    POC CHEM 8    ECG 12 Lead Stroke Evaluation    Insert Large-Bore Peripheral IV - Right AC Preferred    CBC & Differential    Green Top (Gel)    Lavender Top    Gold Top - SST    Gray Top    Light Blue Top         Additional orders considered but not ordered:  MRI brain which was ordered by stroke neurology team    ED Course:    Consultants: Stroke neurology team    ED Course as of 01/30/25 2124   Thu Jan 30, 2025   1708 Patient is received and IV migraine cocktail. [MS]   1708 Per stroke neurology team:  MRI just looks like chronic migraines. If she is feeling better I would say she can go home and follow-up with her neurologist [MS]   1908 Patient feels improved at this point.  I spoke with her about findings and recommendations. [MS]      ED Course User Index  [MS] Gibran Rangel MD              Shared Decision  Making:  After my consideration of clinical presentation and any laboratory/radiology studies obtained, I discussed the findings with the patient/patient representative who is in agreement with the treatment plan and the final disposition.   Risks and benefits of discharge and/or observation/admission were discussed.       AS OF 21:24 EST VITALS:    BP - 123/79  HR - 52  TEMP - 98.7 °F (37.1 °C) (Oral)  O2 SATS - 100%                  DIAGNOSIS  Final diagnoses:   Hemiplegic migraine without status migrainosus, not intractable         DISPOSITION  DISCHARGE    Patient discharged in stable condition.    Reviewed implications of results, diagnosis, meds, responsibility to follow up, warning signs and symptoms of possible worsening, potential complications and reasons to return to ER.    Patient/Family voiced understanding of above instructions.    Discussed plan for discharge, as there is no emergent indication for admission.  Pt/family is agreeable and understands need for follow up and possible repeat testing.  Pt/family is aware that discharge does not mean that nothing is wrong but that it indicates no emergency is currently present that requires admission and they must continue care with follow-up as given below or with a physician of their choice.     FOLLOW-UP  Cheryl Velasquez, APRN  [address] [facility]  [address]  [facility] [address]  [phone]      NEXT AVAILABLE APPOINTMENT - RECHECK OF CONDITION    Magnolia Bautista MD  [address] [place] [address]  [address]  [place] [id number] [phone]      NEXT AVAILABLE APPOINTMENT - RECHECK OF CONDITION    [facility] EMERGENCY DEPARTMENT  [address] [facility] [id number] [phone]    IF YOU HAVE ANY CONCERN OF WORSENING CONDITION         Medication List      No changes were made to your prescriptions during this visit.             Please note that portions of this document were completed with voice recognition software.        Juan Carlos  Gibran CASSIDY MD  01/30/25 1023

## 2025-01-30 NOTE — ED PROVIDER NOTES
EMERGENCY DEPARTMENT ENCOUNTER    Pt Name: Stacia Gomez  MRN: [id number]  Pt :   1975  Room Number:  Room/bed info not found  Date of encounter:  2025  PCP: Cheryl Velasquez APRN  ED Provider: RIP Macedo    Historian: Patient      HPI:  Chief Complaint: Paresthesia, weakness        Context: Stacia Gomez is a 49 y.o. female who presents to the ED c/o right-sided paresthesias and weakness since 6 AM this morning.  Patient reports not feeling well since yesterday.  She felt fatigued, she was nauseous did not eat well, she took Zofran for her symptoms.  She had 1 glass of wine with dinner.  This morning she woke up with numbness and tingling sensation to the right side of the face, arm and leg.  She also had a headache on the left side.  She felt near syncopal, weak and had difficulty ambulating.      PAST MEDICAL HISTORY  Past Medical History:   Diagnosis Date    Anxiety     Asthma     as a child    Fibromyalgia, primary     GERD (gastroesophageal reflux disease)     Headache     History of medical problems     Meineires Disease    HL (hearing loss)     Inflammatory bowel disease     Palpitation          PAST SURGICAL HISTORY  Past Surgical History:   Procedure Laterality Date    CHOLECYSTECTOMY  2016    COLONOSCOPY  2023    ENDOMETRIAL ABLATION      HYSTERECTOMY  2014    OVARIAN CYST REMOVAL Left 2024    TUBAL ABDOMINAL LIGATION  1999    TUBAL ABDOMINAL LIGATION Left 2024         FAMILY HISTORY  Family History   Problem Relation Age of Onset    Arthritis Mother     Hyperlipidemia Mother     Vision loss Mother     Heart disease Maternal Grandmother     Cancer Sister         Breast cancee         SOCIAL HISTORY  Social History     Socioeconomic History    Marital status:    Tobacco Use    Smoking status: Never     Passive exposure: Never    Smokeless tobacco: Never   Vaping Use    Vaping status: Never Used   Substance and Sexual Activity    Alcohol use: Yes      Alcohol/week: 3.0 standard drinks of alcohol     Types: 1 Glasses of wine, 1 Shots of liquor, 1 Drinks containing 0.5 oz of alcohol per week    Drug use: Never    Sexual activity: Yes     Partners: Male     Birth control/protection: Tubal ligation, Hysterectomy         ALLERGIES  Dog epithelium (canis lupus familiaris), Prunus persica, Shellfish allergy, Alimentum, Lemon oil, Mangifera indica, Pineapple, Tomato, Toradol [ketorolac tromethamine], Amoxicillin, Baclofen, Peanut oil, Ascorbate, and Strawberry        REVIEW OF SYSTEMS  Review of Systems       All systems reviewed and negative except for those discussed in HPI.       PHYSICAL EXAM    I have reviewed the triage vital signs and nursing notes.    ED Triage Vitals [01/30/25 1322]   Temp Heart Rate Resp BP SpO2   98.7 °F (37.1 °C) 54 18 140/86 100 %      Temp src Heart Rate Source Patient Position BP Location FiO2 (%)   Oral Monitor Sitting Right arm --       Physical Exam  GENERAL:   Appears in no acute distress. ***  HENT: Nares patent.  EYES: No scleral icterus.  CV: Regular rhythm, regular rate.  RESPIRATORY: Normal effort.  No audible wheezes, rales or rhonchi.  ABDOMEN: Soft, nontender  MUSCULOSKELETAL: No deformities.   NEURO: Alert, moves all extremities, follows commands.  SKIN: Warm, dry, no rash visualized.      LAB RESULTS  No results found for this or any previous visit (from the past 24 hours).    If labs were ordered, I independently reviewed the results and considered them in treating the patient.        RADIOLOGY  No Radiology Exams Resulted Within Past 24 Hours    I ordered and independently reviewed the above noted radiographic studies.      I viewed images of *** which showed *** per my independent interpretation.    See radiologist's dictation for official interpretation.        PROCEDURES    Procedures    No orders to display       MEDICATIONS GIVEN IN ER    Medications - No data to display      MEDICAL DECISION MAKING, PROGRESS, and  CONSULTS    All labs, if obtained, have been independently reviewed by me.  All radiology studies, if obtained, have been reviewed by me and the radiologist dictating the report.  All EKG's, if obtained, have been independently viewed and interpreted by me/my attending physician.      Discussion below represents my analysis of pertinent findings related to patient's condition, differential diagnosis, treatment plan and final disposition.                         Differential diagnosis:    ***      Additional sources:    - Discussed/ obtained information from independent historians:  ***    - External (non-ED) record review:  ***    - Chronic or social conditions impacting care:  ***    - Shared decision making:  ***      Orders placed during this visit:  No orders of the defined types were placed in this encounter.        Additional orders considered but not ordered:  ***    ED Course:    Consultants:                  Shared Decision Making:  After my consideration of clinical presentation and any laboratory/radiology studies obtained, I discussed the findings with the patient/patient representative who is in agreement with the treatment plan and the final disposition.   Risks and benefits of discharge and/or observation/admission were discussed. ***      AS OF 13:34 EST VITALS:    BP - 140/86  HR - 54  TEMP - 98.7 °F (37.1 °C) (Oral)  O2 SATS - 100%                  DIAGNOSIS  Final diagnoses:   None         DISPOSITION  ***      Please note that portions of this document were completed with voice recognition software.     Renetta Simms, APRN   01/30/25   13:34 EST

## 2025-01-30 NOTE — Clinical Note
[facility] EMERGENCY DEPARTMENT  [address]  [facility] [address]  Phone: [phone]    Stacia Gomez was seen and treated in our emergency department on 1/30/2025.  She may return to work on 02/03/2025.         Thank you for choosing [facility].    Renetta Simms, APRN

## 2025-01-30 NOTE — PROGRESS NOTES
"Subjective   Stacia Gomez is a 49 y.o. female.     Chief Complaint   Patient presents with    Fatigue     Fatigue and nausea 2 days.  Zofran has not helped    Numbness     Numbness on right side of body since late yesterday worse this morning.      Headache     Left side headache intermittent 3 days.  Worse today    Nausea              Visit Vitals  /70 (BP Location: Left arm, Patient Position: Sitting, Cuff Size: Adult)   Pulse 50   Temp 98 °F (36.7 °C)   Ht 157.5 cm (62\")   Wt 73.5 kg (162 lb)   SpO2 98%   BMI 29.63 kg/m²         Fatigue  Symptoms are new.   Onset was in the past 7 days.   Symptoms occur constantly.   Symptoms have been unchanged since onset.   Symptoms include fatigue, headaches, myalgias, nausea, numbness, vertigo and weakness.    Pertinent negative symptoms include no abdominal pain, no anorexia, no joint pain, no change in stool, no chest pain, no chills, no congestion, no cough, no diaphoresis, no fever, no joint swelling, no neck pain, no rash, no sore throat, no swollen glands, no dysuria, no visual change and no vomiting.   Aggravating factors include nothing.   Treatments tried include rest.   Improvement on treatment was no relief.   Nausea  Symptoms are new.   Onset was in the past 7 days.   Symptoms occur constantly.   Symptoms have been unchanged since onset.   Symptoms include fatigue, headaches, myalgias, nausea, numbness, vertigo and weakness.    Pertinent negative symptoms include no abdominal pain, no anorexia, no joint pain, no change in stool, no chest pain, no chills, no congestion, no cough, no diaphoresis, no fever, no joint swelling, no neck pain, no rash, no sore throat, no swollen glands, no dysuria, no visual change and no vomiting.   Aggravating factors include nothing.   Treatments tried: zofran.      Pt had heart rate that was low last week.  Pt has migraines and she had increased her metoprolol. Neurology had her decrease her metoprolol.  Pt has not felt good " since yesterday. Heart rate was 50. Pt had had fatigue and nausea.  Pt has fibromyalgia.    Pt did not sleep well and felt numb and headache on left side and numbness and tingling on the right side. Pt has felt weak on the right side.     Pt went to birthday dinner yesterday, had 1 glass of wine. Went home and almost immediately to bed.     Headache pain level 4.   Steady pain on the left.   Pt has been told that she has aortic aneurysm.  The following portions of the patient's history were reviewed and updated as appropriate: allergies, current medications, past family history, past medical history, past social history, past surgical history, and problem list.    Past Medical History:   Diagnosis Date    Anxiety     Asthma     as a child    Fibromyalgia, primary     GERD (gastroesophageal reflux disease)     Headache     History of medical problems     Meineires Disease    HL (hearing loss) 08/22    Inflammatory bowel disease     Palpitation       Past Surgical History:   Procedure Laterality Date    CHOLECYSTECTOMY  09/2016    COLONOSCOPY  01/2023    ENDOMETRIAL ABLATION      HYSTERECTOMY  01/2014    OVARIAN CYST REMOVAL Left 01/29/2024    TUBAL ABDOMINAL LIGATION  07/1999    TUBAL ABDOMINAL LIGATION Left 01/29/2024      Family History   Problem Relation Age of Onset    Arthritis Mother     Hyperlipidemia Mother     Vision loss Mother     Heart disease Maternal Grandmother     Cancer Sister         Breast cancee      Social History     Socioeconomic History    Marital status:    Tobacco Use    Smoking status: Never     Passive exposure: Never    Smokeless tobacco: Never   Vaping Use    Vaping status: Never Used   Substance and Sexual Activity    Alcohol use: Yes     Alcohol/week: 3.0 standard drinks of alcohol     Types: 1 Glasses of wine, 1 Shots of liquor, 1 Drinks containing 0.5 oz of alcohol per week    Drug use: Never    Sexual activity: Yes     Partners: Male     Birth control/protection: Tubal  ligation, Hysterectomy      Allergies   Allergen Reactions    Dog Epithelium (Canis Lupus Familiaris) Hives and Itching    Prunus Persica Hives and Itching    Shellfish Allergy Anaphylaxis     Coughing, hives, carry Epipen    Alimentum Hives    Lemon Oil Hives    Mangifera Indica Hives    Pineapple Hives    Tomato Hives    Toradol [Ketorolac Tromethamine] Unknown - High Severity     Previous reaction with anesthesia    Amoxicillin Hives    Baclofen Other (See Comments)     Nervousness/jitters    Peanut Oil Itching    Ascorbate Rash    Strawberry Itching and Rash       Review of Systems   Constitutional:  Positive for fatigue. Negative for chills, diaphoresis and fever.   HENT:  Negative for congestion and sore throat.    Respiratory:  Negative for cough.    Cardiovascular:  Negative for chest pain.   Gastrointestinal:  Positive for nausea. Negative for abdominal pain, anorexia and vomiting.   Genitourinary:  Negative for dysuria.   Musculoskeletal:  Positive for myalgias. Negative for joint pain and neck pain.   Skin:  Negative for rash.   Neurological:  Positive for vertigo, weakness, numbness and headaches.       Objective   Physical Exam  Vitals and nursing note reviewed.   Constitutional:       Appearance: She is well-developed.   HENT:      Head: Normocephalic.      Right Ear: External ear normal.      Left Ear: External ear normal.      Nose: Nose normal.      Mouth/Throat:      Lips: Pink.      Mouth: Mucous membranes are moist. No injury.      Dentition: Normal dentition.      Tongue: No lesions.      Palate: No mass.      Pharynx: No pharyngeal swelling, oropharyngeal exudate, posterior oropharyngeal erythema or uvula swelling.   Eyes:      General: Lids are normal.      Conjunctiva/sclera: Conjunctivae normal.      Pupils: Pupils are equal, round, and reactive to light.   Neck:      Thyroid: No thyroid mass or thyromegaly.      Vascular: No carotid bruit.      Trachea: Trachea normal.   Cardiovascular:       Rate and Rhythm: Normal rate and regular rhythm.      Heart sounds: No murmur heard.  Pulmonary:      Effort: Pulmonary effort is normal. No respiratory distress.      Breath sounds: Normal breath sounds. No decreased breath sounds, wheezing, rhonchi or rales.   Chest:      Chest wall: No tenderness.   Abdominal:      General: Bowel sounds are normal.      Palpations: Abdomen is soft.      Tenderness: There is no abdominal tenderness.   Musculoskeletal:         General: Normal range of motion.      Cervical back: Normal range of motion and neck supple.   Skin:     General: Skin is warm and dry.   Neurological:      Mental Status: She is alert and oriented to person, place, and time.      Cranial Nerves: Cranial nerves 2-12 are intact. No cranial nerve deficit or facial asymmetry.      Deep Tendon Reflexes:      Reflex Scores:       Bicep reflexes are 1+ on the right side and 1+ on the left side.       Brachioradialis reflexes are 1+ on the right side and 1+ on the left side.       Patellar reflexes are 1+ on the right side and 1+ on the left side.     Comments: Weak  right hand   Psychiatric:         Behavior: Behavior normal.         Assessment & Plan   Problems Addressed this Visit    None  Visit Diagnoses       Right hand weakness    -  Primary    New daily persistent headache        Relevant Medications    Qulipta 60 MG tablet          Diagnoses         Codes Comments    Right hand weakness    -  Primary ICD-10-CM: R29.898  ICD-9-CM: 728.87     New daily persistent headache     ICD-10-CM: G44.52  ICD-9-CM: 339.42             To ER, report called           Current Outpatient Medications:     albuterol sulfate  (90 Base) MCG/ACT inhaler, Inhale 2 puffs Every 4 (Four) Hours As Needed for Wheezing., Disp: 18 g, Rfl: 1    buPROPion XL (WELLBUTRIN XL) 300 MG 24 hr tablet, TAKE 1 TABLET BY MOUTH DAILY, Disp: 90 tablet, Rfl: 0    dicyclomine (BENTYL) 20 MG tablet, Take 1 tablet by mouth As Needed., Disp: ,  Rfl:     EPINEPHrine (EPIPEN) 0.3 MG/0.3ML solution auto-injector injection, Inject 0.3 mL into the appropriate muscle as directed by prescriber 1 (One) Time As Needed., Disp: , Rfl:     famotidine (PEPCID) 20 MG tablet, Take 1 tablet by mouth 2 (Two) Times a Day As Needed., Disp: , Rfl:     fexofenadine (ALLEGRA) 60 MG tablet, Take 1 tablet by mouth Daily., Disp: 30 tablet, Rfl: 0    fluconazole (DIFLUCAN) 100 MG tablet, Take 1 tablet by mouth Daily As Needed., Disp: , Rfl:     hydroCHLOROthiazide 25 MG tablet, Take 1 tablet by mouth Daily., Disp: 30 tablet, Rfl: 0    hydrOXYzine (ATARAX) 10 MG tablet, Take 1 tablet by mouth 2 (Two) Times a Day As Needed for Anxiety., Disp: , Rfl:     metoprolol tartrate (LOPRESSOR) 25 MG tablet, TAKE 1 TABLET BY MOUTH TWICE DAILY, Disp: 180 tablet, Rfl: 0    Naltrexone powder, Use 1 Units Daily. Naltrexone 100% powder Compounded 2mg low dose naltrexone compounded lactose free 2mg, 1 po qd Dispense quantity 30 gram with 3 refills, Disp: 30 g, Rfl: 4    ondansetron (ZOFRAN) 4 MG tablet, , Disp: , Rfl:     promethazine (PHENERGAN) 25 MG tablet, Take 1 tablet by mouth Every 6 (Six) Hours As Needed for Nausea or Vomiting., Disp: 40 tablet, Rfl: 4    Qulipta 60 MG tablet, Take 1 tablet by mouth Daily., Disp: , Rfl:     Semaglutide-Weight Management (Wegovy) 1.7 MG/0.75ML solution auto-injector, Inject 0.75 mL under the skin into the appropriate area as directed Every 7 (Seven) Days., Disp: 3 mL, Rfl: 3    traZODone (DESYREL) 50 MG tablet, TAKE 1 TO 2 TABLETS BY MOUTH EVERY NIGHT AT BEDTIME, Disp: 60 tablet, Rfl: 1    Ubrelvy 100 MG tablet, Take 1 tablet by mouth 2 (Two) Times a Day As Needed., Disp: , Rfl:     Return for Recheck post ER.

## 2025-01-30 NOTE — CONSULTS
"Stroke Consult Note    Patient Name: Stacia Gomez   MRN: [id number]  Age: 49 y.o.  Sex: female  : 1975    Primary Care Physician: Cheryl Velasquez APRN  Referring Physician:  MD Juan Carlos    TIME STROKE TEAM CALLED: 1334 EST     TIME PATIENT SEEN: 1335 EST    Handedness: Right  Race: -American    Chief Complaint/Reason for Consultation: Right-sided numbness and weakness, headache    HPI: Stacia Gomez is a 49-year-old female with past medical history of migraine, HTN, fibromyalgia, anxiety, depression, asthma, palpitations presented [facility] emergency department via private vehicle for further evaluation of right-sided numbness and weakness as well as headache and nausea since 6 AM when she woke up this morning.  Last known well was 10 PM when she went to bed last night.  She follows with [facility] for migraines, last appointment was .  She currently takes Ubrelvy and Qulipta.  She tells me that her headache today is much more tolerable than her typical migraine.  She denies ever having right sided paresthesias or weakness before.  She also tells me that she feels like she is in a \"fog\".  CT head shows no acute intracranial abnormality.  CTA head/neck and CT perfusion are negative for any flow-limiting stenosis or LVO.  She is not a candidate for TNK as she presented outside of the time window.  She is not a candidate for neurosurgical intervention as there is no LVO on CT scan.    Of note, she does complain of dizziness in the past few weeks.  She believes this is related to her low heart rate (HR 40-50).  At her neurology appointment last week, they told her to decrease the amount of metoprolol she was taking.  She currently takes 12.5 mg metoprolol twice daily.    Also on 2022 patient had MRI brain at [facility] that showed \"mild bilateral deep white matter changes, nonspecific.  Likely due to small vessel disease, demyelinating process not excluded.\"  Patient does tell " "me that she has been dropping things often.    Last Known Normal Date/Time: 1/29/2025 2200 EST     Review of Systems   Constitutional:         \"Foggy\"   Neurological:  Positive for dizziness, weakness, numbness and headaches.      Past Medical History:   Diagnosis Date    Anxiety     Asthma     as a child    Fibromyalgia, primary     GERD (gastroesophageal reflux disease)     Headache     History of medical problems     Meineires Disease    HL (hearing loss) 08/22    Inflammatory bowel disease     Palpitation      Past Surgical History:   Procedure Laterality Date    CHOLECYSTECTOMY  09/2016    COLONOSCOPY  01/2023    ENDOMETRIAL ABLATION      HYSTERECTOMY  01/2014    OVARIAN CYST REMOVAL Left 01/29/2024    TUBAL ABDOMINAL LIGATION  07/1999    TUBAL ABDOMINAL LIGATION Left 01/29/2024     Family History   Problem Relation Age of Onset    Arthritis Mother     Hyperlipidemia Mother     Vision loss Mother     Heart disease Maternal Grandmother     Cancer Sister         Breast cancee     Social History     Socioeconomic History    Marital status:    Tobacco Use    Smoking status: Never     Passive exposure: Never    Smokeless tobacco: Never   Vaping Use    Vaping status: Never Used   Substance and Sexual Activity    Alcohol use: Yes     Alcohol/week: 3.0 standard drinks of alcohol     Types: 1 Glasses of wine, 1 Shots of liquor, 1 Drinks containing 0.5 oz of alcohol per week    Drug use: Never    Sexual activity: Yes     Partners: Male     Birth control/protection: Tubal ligation, Hysterectomy     Allergies   Allergen Reactions    Dog Epithelium (Canis Lupus Familiaris) Hives and Itching    Prunus Persica Hives and Itching    Shellfish Allergy Anaphylaxis     Coughing, hives, carry Epipen    Alimentum Hives    Lemon Oil Hives    Mangifera Indica Hives    Pineapple Hives    Tomato Hives    Toradol [Ketorolac Tromethamine] Unknown - High Severity     Previous reaction with anesthesia    Amoxicillin Hives    Baclofen " Other (See Comments)     Nervousness/jitters    Peanut Oil Itching    Ascorbate Rash    Strawberry Itching and Rash     Prior to Admission medications    Medication Sig Start Date End Date Taking? Authorizing Provider   albuterol sulfate  (90 Base) MCG/ACT inhaler Inhale 2 puffs Every 4 (Four) Hours As Needed for Wheezing. 3/12/24   Cheryl Velasquez APRN   buPROPion XL (WELLBUTRIN XL) 300 MG 24 hr tablet TAKE 1 TABLET BY MOUTH DAILY 1/8/25   Cheryl Velasquez APRN   dicyclomine (BENTYL) 20 MG tablet Take 1 tablet by mouth As Needed. 9/12/24 9/12/25  Abbey Glover MD   EPINEPHrine (EPIPEN) 0.3 MG/0.3ML solution auto-injector injection Inject 0.3 mL into the appropriate muscle as directed by prescriber 1 (One) Time As Needed. 3/11/24   Abbey Glover MD   famotidine (PEPCID) 20 MG tablet Take 1 tablet by mouth 2 (Two) Times a Day As Needed. 7/11/24   Abbey Glover MD   fexofenadine (ALLEGRA) 60 MG tablet Take 1 tablet by mouth Daily. 8/12/24   Cheryl Velasquez APRN   fluconazole (DIFLUCAN) 100 MG tablet Take 1 tablet by mouth Daily As Needed. 9/6/24   Abbey Glover MD   hydroCHLOROthiazide 25 MG tablet Take 1 tablet by mouth Daily. 8/12/24   Cheryl Velasquez APRN   hydrOXYzine (ATARAX) 10 MG tablet Take 1 tablet by mouth 2 (Two) Times a Day As Needed for Anxiety.    Abbey Glover MD   metoprolol tartrate (LOPRESSOR) 25 MG tablet TAKE 1 TABLET BY MOUTH TWICE DAILY 5/30/24   Cheryl Velasquez APRN   Naltrexone powder Use 1 Units Daily. Naltrexone 100% powder Compounded 2mg low dose naltrexone compounded lactose free 2mg, 1 po qd Dispense quantity 30 gram with 3 refills 12/2/24   Cheryl Velasquez APRN   ondansetron (ZOFRAN) 4 MG tablet  11/20/24   Abbey Glover MD   promethazine (PHENERGAN) 25 MG tablet Take 1 tablet by mouth Every 6 (Six) Hours As Needed for Nausea or Vomiting. 12/1/23   Marce Pinzon, DO   Qulipta 60 MG tablet Take 1 tablet by mouth Daily.  1/22/25   Provider, MD Abbey   Semaglutide-Weight Management (Wegovy) 1.7 MG/0.75ML solution auto-injector Inject 0.75 mL under the skin into the appropriate area as directed Every 7 (Seven) Days. 8/26/24   Cheryl Velasquez RIP   traZODone (DESYREL) 50 MG tablet TAKE 1 TO 2 TABLETS BY MOUTH EVERY NIGHT AT BEDTIME 11/14/24   Cheryl Velasquez APRN   Ubrelvy 100 MG tablet Take 1 tablet by mouth 2 (Two) Times a Day As Needed.    Provider, MD Abbey   cyclobenzaprine (FLEXERIL) 5 MG tablet Take 1 tablet by mouth 3 (Three) Times a Day As Needed for Muscle Spasms. 11/1/24 1/30/25  Ed Anderson MD         Temp:  [98 °F (36.7 °C)-98.7 °F (37.1 °C)] 98.7 °F (37.1 °C)  Heart Rate:  [50-54] 54  Resp:  [18] 18  BP: (114-140)/(70-86) 140/86  Neurological Exam  Mental Status  Awake, alert and oriented to person, place and time. Oriented to person, place, time and situation. Oriented to person, place, and time. Memory is normal. Recent and remote memory are intact. Speech is normal. Language is fluent with no aphasia. Attention and concentration are normal.    Cranial Nerves  CN II: Visual fields full to confrontation.  CN III, IV, VI: Extraocular movements intact bilaterally. Pupils equal round and reactive to light bilaterally.  CN V: Facial sensation is normal.  CN VII: Full and symmetric facial movement.  CN VIII: Hearing appears intact.  CN XII: Tongue midline without atrophy or fasciculations.    Motor  Normal muscle bulk throughout. Normal muscle tone. Strength is 5/5 throughout all four extremities.  RUE 4/5,  strength inconsistent  LUE 5/5  RLE 3/5  LLE 5/5.    Sensory  Light touch abnormality: Reported sensation deficit to right upper and lower extremities.  No right-sided hemispatial neglect. No left-sided hemispatial neglect.    Coordination    No dysmetria noted.    Gait   Normal gait.Casual gait: Normal and shuffling gait.  Not observed.      Physical Exam  Vitals and nursing note reviewed.    Constitutional:       General: She is not in acute distress.     Appearance: Normal appearance. She is not ill-appearing or toxic-appearing.   HENT:      Head: Normocephalic and atraumatic.      Mouth/Throat:      Mouth: Mucous membranes are moist.   Eyes:      Extraocular Movements: Extraocular movements intact.      Pupils: Pupils are equal, round, and reactive to light.   Cardiovascular:      Rate and Rhythm: Regular rhythm. Bradycardia present.      Pulses: Normal pulses.      Heart sounds: Normal heart sounds.   Pulmonary:      Effort: Pulmonary effort is normal. No respiratory distress.      Breath sounds: Normal breath sounds.   Musculoskeletal:      Cervical back: Normal range of motion and neck supple. No rigidity or tenderness.   Skin:     General: Skin is warm and dry.      Capillary Refill: Capillary refill takes less than 2 seconds.   Neurological:      General: No focal deficit present.      Mental Status: She is oriented to person, place, and time. Mental status is at baseline.      Cranial Nerves: No cranial nerve deficit.      Sensory: Sensory deficit present.      Motor: Motor strength is normal.Weakness present.      Coordination: Coordination normal.      Gait: Gait is intact. Gait normal.   Psychiatric:         Attention and Perception: Attention normal.         Mood and Affect: Mood normal.         Speech: Speech normal.         Behavior: Behavior normal. Behavior is cooperative.         Cognition and Memory: Cognition and memory normal.         Judgment: Judgment normal.         Acute Stroke Data    Thrombolytic Inclusion / Exclusion Criteria    Time: 13:53 EST  Person Administering Scale: RIP De Leon    Inclusion Criteria  [x]   18 years of age or greater   []   Onset of symptoms < 4.5 hours before beginning treatment (stroke onset = time patient was last seen well or without symptoms).   []   Diagnosis of acute ischemic stroke causing measurable disabling deficit (Complete  Hemianopia, Any Aphasia, Visual or Sensory Extinction, Any weakness limiting sustained effort against gravity)   []   Any remaining deficit considered potentially disabling in view of patient and practitioner   Exclusion criteria (Do not proceed with Alteplase if any are checked under exclusion criteria)  [x]   Onset unknown or GREATER than 4.5 hours   []   ICH on CT/MRI   []   CT demonstrates hypodensity representing acute or subacute infarct   []   Significant head trauma or prior stroke in the previous 3 months   []   Symptoms suggestive of subarachnoid hemorrhage   []   History of un-ruptured intracranial aneurysm GREATER than 10 mm   []   Recent intracranial or intraspinal surgery within the last 3 months   []   Arterial puncture at a non-compressible site in the previous 7 days   []   Active internal bleeding   []   Acute bleeding tendency   []   Platelet count LESS than 100,000 for known hematological diseases such as leukemia, thrombocytopenia or chronic cirrhosis   []   Current use of anticoagulant with INR GREATER than 1.7 or PT GREATER than 15 seconds, aPTT GREATER than 40 seconds   []   Heparin received within 48 hours, resulting in abnormally elevated aPTT GREATER than upper limit of normal   []   Current use of direct thrombin inhibitors or direct factor Xa inhibitors in the past 48 hours   []   Elevated blood pressure refractory to treatment (systolic GREATER than 185 mm/Hg or diastolic  GREATER than 110 mm/Hg   []   Suspected infective endocarditis and aortic arch dissection   []   Current use of therapeutic treatment dose of low-molecular-weight heparin (LMWH) within the previous 24 hours   []   Structural GI malignancy or bleed   Relative exclusion for all patients  []   Only minor non-disabling symptoms   []   Pregnancy   []   Seizure at onset with postictal residual neurological impairments   []   Major surgery or previous trauma within past 14 days   []   History of previous spontaneous ICH,  intracranial neoplasm, or AV malformation   []   Postpartum (within previous 14 days)   []   Recent GI or urinary tract hemorrhage (within previous 21 days)   []   Recent acute MI (within previous 3 months)   []   History of un-ruptured intracranial aneurysm LESS than 10 mm   []   History of ruptured intracranial aneurysm   []   Blood glucose LESS than 50 mg/dL (2.7 mmol/L)   []   Dural puncture within the last 7 days   []   Known GREATER than 10 cerebral microbleeds   Additional exclusions for patients with symptoms onset between 3 and 4.5 hours.  []   Age > 80.   []   On any anticoagulants regardless of INR  >>> Warfarin (Coumadin), Heparin, Enoxaparin (Lovenox), fondaparinux (Arixtra), bivalirudin (Angiomax), Argatroban, dabigatran (Pradaxa), rivaroxaban (Xarelto), or apixaban (Eliquis)   []   Severe stroke (NIHSS > 25).   []   History of BOTH diabetes and previous ischemic stroke.   []   The risks and benefits have been discussed with the patient or family related to the administration of IV thrombolytic therapy for stroke symptoms.   []   I have discussed and reviewed the patient's case and imaging with the attending prior to IV thrombolytic therapy.   N/A Time IV thrombolytic administered       Hospital Meds:  Scheduled-    Infusions-     PRNs-   sodium chloride    Functional Status Prior to Current Stroke/Hiram Score: 0    NIH Stroke Scale  Time: 13:53 EST  Person Administering Scale: RIP De Leon    Interval: baseline  1a. Level of Consciousness: 0-->Alert, keenly responsive  1b. LOC Questions: 0-->Answers both questions correctly  1c. LOC Commands: 0-->Performs both tasks correctly  2. Best Gaze: 0-->Normal  3. Visual: 0-->No visual loss  4. Facial Palsy: 0-->Normal symmetrical movements  5a. Motor Arm, Left: 0-->No drift, limb holds 90 (or 45) degrees for full 10 secs  5b. Motor Arm, Right: 0-->No drift, limb holds 90 (or 45) degrees for full 10 secs  6a. Motor Leg, Left: 0-->No drift, leg holds 30  degree position for full 5 secs  6b. Motor Leg, Right: 2-->Some effort against gravity, leg falls to bed by 5 secs, but has some effort against gravity  7. Limb Ataxia: 0-->Absent  8. Sensory: 1-->Mild-to-moderate sensory loss, patient feels pinprick is less sharp or is dull on the affected side, or there is a loss of superficial pain with pinprick, but patient is aware of being touched  9. Best Language: 0-->No aphasia, normal  10. Dysarthria: 0-->Normal  11. Extinction and Inattention (formerly Neglect): 0-->No abnormality    Total (NIH Stroke Scale): 3      Results Reviewed:  I have personally reviewed current lab, radiology, and data.  CT Angiogram Head w AI Analysis of LVO    Result Date: 1/30/2025  1.No acute abnormality is identified within the large arteries of the head or neck. 2.No significant stenosis of the bilateral internal carotid arteries. 3.CT perfusion study demonstrates no territorial ischemia or core infarct. 4.Additional findings as detailed above. Electronically Signed: Jann Easley MD  1/30/2025 2:24 PM EST  Workstation ID: [id number]    CT Angiogram Neck    Result Date: 1/30/2025  1.No acute abnormality is identified within the large arteries of the head or neck. 2.No significant stenosis of the bilateral internal carotid arteries. 3.CT perfusion study demonstrates no territorial ischemia or core infarct. 4.Additional findings as detailed above. Electronically Signed: Jann Easley MD  1/30/2025 2:24 PM EST  Workstation ID: [id number]    CT CEREBRAL PERFUSION WITH & WITHOUT CONTRAST    Result Date: 1/30/2025  1.No acute abnormality is identified within the large arteries of the head or neck. 2.No significant stenosis of the bilateral internal carotid arteries. 3.CT perfusion study demonstrates no territorial ischemia or core infarct. 4.Additional findings as detailed above. Electronically Signed: Jann Easley MD  1/30/2025 2:24 PM EST  Workstation ID: [id number]    CT Head Without Contrast  Stroke Protocol    Result Date: 1/30/2025  Impression: No acute intracranial findings. Electronically Signed: Efra Burnett MD  1/30/2025 1:55 PM EST  Workstation ID: [id number]   WBC   Date Value Ref Range Status   01/30/2025 4.51 3.40 - 10.80 10*3/mm3 Final     RBC   Date Value Ref Range Status   01/30/2025 4.26 3.77 - 5.28 10*6/mm3 Final     Hemoglobin   Date Value Ref Range Status   01/30/2025 12.6 12.0 - 15.9 g/dL Final     Hematocrit   Date Value Ref Range Status   01/30/2025 39.6 34.0 - 46.6 % Final     MCV   Date Value Ref Range Status   01/30/2025 93.0 79.0 - 97.0 fL Final     MCH   Date Value Ref Range Status   01/30/2025 29.6 26.6 - 33.0 pg Final     MCHC   Date Value Ref Range Status   01/30/2025 31.8 31.5 - 35.7 g/dL Final     RDW   Date Value Ref Range Status   01/30/2025 12.7 12.3 - 15.4 % Final     RDW-SD   Date Value Ref Range Status   01/30/2025 43.0 37.0 - 54.0 fl Final     MPV   Date Value Ref Range Status   01/30/2025 10.4 6.0 - 12.0 fL Final     Platelets   Date Value Ref Range Status   01/30/2025 319 140 - 450 10*3/mm3 Final     Neutrophil %   Date Value Ref Range Status   01/30/2025 51.3 42.7 - 76.0 % Final     Lymphocyte %   Date Value Ref Range Status   01/30/2025 39.0 19.6 - 45.3 % Final     Monocyte %   Date Value Ref Range Status   01/30/2025 8.0 5.0 - 12.0 % Final     Eosinophil %   Date Value Ref Range Status   01/30/2025 1.1 0.3 - 6.2 % Final     Basophil %   Date Value Ref Range Status   01/30/2025 0.2 0.0 - 1.5 % Final     Immature Grans %   Date Value Ref Range Status   01/30/2025 0.4 0.0 - 0.5 % Final     Neutrophils, Absolute   Date Value Ref Range Status   01/30/2025 2.31 1.70 - 7.00 10*3/mm3 Final     Lymphocytes, Absolute   Date Value Ref Range Status   01/30/2025 1.76 0.70 - 3.10 10*3/mm3 Final     Monocytes, Absolute   Date Value Ref Range Status   01/30/2025 0.36 0.10 - 0.90 10*3/mm3 Final     Eosinophils, Absolute   Date Value Ref Range Status   01/30/2025 0.05 0.00 - 0.40  10*3/mm3 Final     Basophils, Absolute   Date Value Ref Range Status   01/30/2025 0.01 0.00 - 0.20 10*3/mm3 Final     Immature Grans, Absolute   Date Value Ref Range Status   01/30/2025 0.02 0.00 - 0.05 10*3/mm3 Final     nRBC   Date Value Ref Range Status   01/30/2025 0.0 0.0 - 0.2 /100 WBC Final     Lab Results   Component Value Date    GLUCOSE 84 09/11/2024    BUN 10 09/11/2024    CREATININE 0.85 09/11/2024     09/11/2024    K 4.0 09/11/2024     09/11/2024    CALCIUM 9.3 09/11/2024    PROTEINTOT 7.3 09/11/2024    ALBUMIN 3.8 09/11/2024    ALT 8 09/11/2024    AST 22 09/11/2024    ALKPHOS 62 09/11/2024    BILITOT 0.4 09/11/2024    GLOB 3.5 09/11/2024    AGRATIO 1.1 09/11/2024    BCR 11.8 09/11/2024    ANIONGAP 13.8 09/11/2024    EGFR 85.2 09/11/2024       Assessment/Plan:    This is a 49-year-old female with past medical history of migraine, HTN, fibromyalgia, anxiety, depression, asthma, palpitations presented [facility] emergency department via private vehicle for further evaluation of right-sided numbness and weakness as well as headache and nausea since 6 AM when she woke up this morning.  Last known well was 10 PM when she went to bed last night. CT head shows no acute intracranial abnormality.  CTA head/neck and CT perfusion are negative for any flow-limiting stenosis or LVO.  She is not a candidate for TNK as she presented outside of the time window.  She is not a candidate for neurosurgical intervention as there is no LVO on CT scan.  She will receive migraine cocktail and MRI brain with and without contrast while in the emergency department.    Antiplatelet PTA: None  Anticoagulant PTA: None        Right-sided numbness/weakness, headache  Differential diagnosis includes atypical migraine vs CVA/TIA vs FNS  -NPO until bedside nursing dysphagia screen completed  -MRI brain with and without contrast pending  -Migraine cocktail  -Allow autoregulation of blood pressure for adequate  cerebral blood flow, goal SBP<220    Plan of care was discussed with Dr. Rangel (emergency attending) and patient.  Stroke neurology will continue to follow for results of MRI.  Please call with any questions or concerns.  Thank you for this consult.      Naima Jackson, RIP  January 30, 2025  13:53 EST     Addendum: [id number]  MRI brain with and without contrast resulted which showed no acute intracranial findings, it did reveal a few scattered frontal predominant areas of T2 FLAIR hyperintensity in the subcortical white matter which is nonspecific but can be seen with chronic migraines or small vessel disease.  I might add back to the bedside and discussed findings with patient.  She tells me that she feels some better, she does not feel as foggy but still has some mild right sided numbness.  Patient tells me she is agreeable to follow-up with her general neurologist at [facility].    MRI Brain With & Without Contrast    Result Date: 1/30/2025  Impression: No acute intracranial finding. No abnormal intracranial enhancement. A few scattered frontal predominant areas of T2/FLAIR hyperintensity in the subcortical white matter, nonspecific, but can be seen with chronic migraines or perhaps from early small vessel ischemic/hypertensive changes. No specific pattern/distribution strongly suggestive of a demyelinating process. Electronically Signed: Matthias Caro  1/30/2025 4:42 PM EST  Workstation ID: [id number]    Plan of care discussed with Dr. Rangel (emergency attending), patient and patient's sister at the bedside.  No further role from stroke neurology at this time.  Defer rest of care to emergency department physician.  Please call with any questions or concerns.

## 2025-02-04 LAB
BUN BLDA-MCNC: 12 MG/DL (ref 8–26)
CA-I BLDA-SCNC: 1.13 MMOL/L (ref 1.2–1.32)
CHLORIDE BLDA-SCNC: 100 MMOL/L (ref 98–109)
CO2 BLDA-SCNC: 28 MMOL/L (ref 24–29)
CREAT BLDA-MCNC: 1 MG/DL (ref 0.6–1.3)
EGFRCR SERPLBLD CKD-EPI 2021: 70.1 ML/MIN/1.73
GLUCOSE BLDC GLUCOMTR-MCNC: 78 MG/DL (ref 70–130)
HCT VFR BLDA CALC: 40 % (ref 38–51)
HGB BLDA-MCNC: 13.6 G/DL (ref 12–17)
POTASSIUM BLDA-SCNC: 3.6 MMOL/L (ref 3.5–4.9)
SODIUM BLD-SCNC: 139 MMOL/L (ref 138–146)

## 2025-02-07 RX ORDER — TRAZODONE HYDROCHLORIDE 50 MG/1
50-100 TABLET, FILM COATED ORAL
Qty: 60 TABLET | Refills: 1 | Status: SHIPPED | OUTPATIENT
Start: 2025-02-07

## 2025-02-07 NOTE — TELEPHONE ENCOUNTER
Rx Refill Note  Requested Prescriptions     Pending Prescriptions Disp Refills    traZODone (DESYREL) 50 MG tablet [Pharmacy Med Name: TRAZODONE 50MG TABLETS] 60 tablet 1     Sig: TAKE 1 TO 2 TABLETS BY MOUTH EVERY NIGHT AT BEDTIME      Last office visit with prescribing clinician: 12/2/2024     Next office visit with prescribing clinician: 3/10/2025       Debbi Dias MA  02/07/25, 17:05 EST

## 2025-02-19 DIAGNOSIS — F41.1 GAD (GENERALIZED ANXIETY DISORDER): ICD-10-CM

## 2025-02-19 DIAGNOSIS — M25.50 ARTHRALGIA, UNSPECIFIED JOINT: ICD-10-CM

## 2025-02-19 DIAGNOSIS — I10 PRIMARY HYPERTENSION: ICD-10-CM

## 2025-02-19 DIAGNOSIS — H81.09 MENIERE'S DISEASE, UNSPECIFIED LATERALITY: ICD-10-CM

## 2025-02-19 RX ORDER — HYDROCHLOROTHIAZIDE 25 MG/1
25 TABLET ORAL DAILY
Qty: 90 TABLET | Refills: 0 | Status: SHIPPED | OUTPATIENT
Start: 2025-02-19

## 2025-02-19 RX ORDER — BUPROPION HYDROCHLORIDE 300 MG/1
300 TABLET ORAL DAILY
Qty: 90 TABLET | Refills: 0 | Status: SHIPPED | OUTPATIENT
Start: 2025-02-19

## 2025-02-19 NOTE — TELEPHONE ENCOUNTER
Rx Refill Note  Requested Prescriptions     Pending Prescriptions Disp Refills    hydroCHLOROthiazide 25 MG tablet 30 tablet 0     Sig: Take 1 tablet by mouth Daily.    Naltrexone powder 30 g 4     Sig: Use 1 Units Daily. Naltrexone 100% powder Compounded 2mg low dose naltrexone compounded lactose free 2mg, 1 po qd Dispense quantity 30 gram with 3 refills    buPROPion XL (WELLBUTRIN XL) 300 MG 24 hr tablet 90 tablet 0     Sig: Take 1 tablet by mouth Daily.      Last office visit with prescribing clinician: 12/2/2024     Next office visit with prescribing clinician: 2/26/2025     Debbi Dias MA  02/19/25, 16:21 EST

## 2025-02-20 DIAGNOSIS — R00.2 PALPITATIONS: ICD-10-CM

## 2025-02-20 RX ORDER — METOPROLOL TARTRATE 25 MG/1
25 TABLET, FILM COATED ORAL 2 TIMES DAILY
Qty: 180 TABLET | Refills: 0 | Status: SHIPPED | OUTPATIENT
Start: 2025-02-20

## 2025-02-24 RX ORDER — NALTREXONE 100 %
1 POWDER (GRAM) MISCELLANEOUS DAILY
Qty: 30 G | Refills: 4 | OUTPATIENT
Start: 2025-02-24

## 2025-02-24 NOTE — TELEPHONE ENCOUNTER
Rx Refill Note  Requested Prescriptions     Pending Prescriptions Disp Refills    Naltrexone powder 30 g 4     Sig: Use 1 Units Daily. Naltrexone 100% powder Compounded 2mg low dose naltrexone compounded lactose free 2mg, 1 po qd Dispense quantity 30 gram with 3 refills     Signed Prescriptions Disp Refills    hydroCHLOROthiazide 25 MG tablet 90 tablet 0     Sig: Take 1 tablet by mouth Daily.     Authorizing Provider: LEISA MARKS     Ordering User: ZAY CARRANZA    buPROPion XL (WELLBUTRIN XL) 300 MG 24 hr tablet 90 tablet 0     Sig: Take 1 tablet by mouth Daily.     Authorizing Provider: LEISA MARKS     Ordering User: ZAY CARRANZA      Last office visit with prescribing clinician: 12/2/2024   Last telemedicine visit with prescribing clinician: Visit date not found   Next office visit with prescribing clinician: 2/26/2025    No protocol for medication.     Marian Roberson MA  02/24/25, 14:06 EST

## 2025-02-26 ENCOUNTER — TELEMEDICINE (OUTPATIENT)
Dept: INTERNAL MEDICINE | Facility: CLINIC | Age: 50
End: 2025-02-26
Payer: COMMERCIAL

## 2025-02-26 VITALS — BODY MASS INDEX: 30.36 KG/M2 | WEIGHT: 165 LBS | HEIGHT: 62 IN

## 2025-02-26 DIAGNOSIS — E66.811 OBESITY (BMI 30.0-34.9): Primary | ICD-10-CM

## 2025-02-26 PROCEDURE — 99213 OFFICE O/P EST LOW 20 MIN: CPT | Performed by: NURSE PRACTITIONER

## 2025-02-26 NOTE — PROGRESS NOTES
This was an audio and video enabled visit.   This provider is located at [facility] [place], [place] using a secure Character [employer] Video Visit through [employer]. Stacia  is being seen remotely via telehealth  in [place].  provided a secure environment for this session.  Stacia may be asked to present for in office testing and/or evaluation if felt to be unsafe for telemedicine.    The provider identified herself /credentials as appropriate.   By proceeding with the telehealth visit, the patient consents to be seen remotely which allows for patient identifiable information to be sent to a third party as needed. The patient may refuse to be seen remotely at any time. The electronic data is encrypted and password protected, and the patient is advised of the potential risks to privacy not withstanding such measures.    You have chosen to receive care through a telehealth visit. Do you consent to use a video/audio connection for your medical care today? Yes      Subjective   Stacia Gomez is a 49 y.o. female.     Chief Complaint   Patient presents with    Obesity       History of Present Illness     History of Present Illness  The patient is a 49-year-old female who is being seen today via video visit regarding weight loss.    She has been managing her weight effectively, with the exception of a period during the holidays when she underwent a gastroparesis test. She had a close relationship with Dr. Crespo and informed her about taking a shot, expressing concerns about its potential impact on the test results. Dr. Crespo advised that it should be fine to proceed with the test. She resumed her medication regimen, anticipating typical side effects such as nausea, minimal cramping, constipation, and bloating, which she experienced for a day or two before returning to normal. She takes Zofran as needed. She reports that the medication works rapidly, often suppressing her appetite by dinner time if taken in the morning. She  has lost approximately 4 pounds and has increased her physical activity due to a recent move. She was previously on a biweekly dose of Wegovy 1.7 mg but discontinued it two weeks ago after a single dose. Her current weight is 155 pounds. She has been informed by Dr. Yung that her gastrointestinal symptoms may be allergy-related, but she disagrees, maintaining that she has not consumed any prohibited foods. She has previously tolerated Mounjaro well but notes that Wegovy appears to have a more immediate effect.    MEDICATIONS  Current: Zofran, Wegovy      Results  Imaging  Gastric emptying study was borderline, considered gastroparesis.      Lab Results   Component Value Date    WBC 4.51 01/30/2025    HGB 12.6 01/30/2025    HCT 39.6 01/30/2025    MCV 93.0 01/30/2025     01/30/2025     Lab Results   Component Value Date    GLUCOSE 78 01/30/2025    BUN 12 01/30/2025    CREATININE 1.03 (H) 01/30/2025    EGFR 67.6 01/30/2025    BCR 11.7 01/30/2025    K 3.6 01/30/2025    CO2 28.0 01/30/2025    CALCIUM 9.4 01/30/2025    ALBUMIN 3.8 09/11/2024    BILITOT 0.4 09/11/2024    AST 22 09/11/2024    ALT 8 09/11/2024     Lab Results   Component Value Date    CHOL 172 09/01/2023    TRIG 72 09/01/2023    HDL 63 (H) 09/01/2023    LDL 95 09/01/2023     Lab Results   Component Value Date    TSH 0.447 09/01/2023           The following portions of the patient's history were reviewed and updated as appropriate: allergies, current medications, past family history, past medical history, past social history, past surgical history and problem list.        No outpatient medications have been marked as taking for the 2/26/25 encounter (Telemedicine) with Cheryl Velasquez APRN.     Allergies   Allergen Reactions    Dog Epithelium (Canis Lupus Familiaris) Hives and Itching    Prunus Persica Hives and Itching    Shellfish Allergy Anaphylaxis     Coughing, hives, carry Epipen    Alimentum Hives    Lemon Oil Hives    Mangifera Indica  "Hives    Pineapple Hives    Tomato Hives    Toradol [Ketorolac Tromethamine] Unknown - High Severity     Previous reaction with anesthesia    Amoxicillin Hives    Baclofen Other (See Comments)     Nervousness/jitters    Peanut Oil Itching    Ascorbate Rash    Strawberry Itching and Rash           Objective     Physical Exam   Constitutional: She is oriented to person, place, and time. She appears well-developed and well-nourished. No distress. She appears obese.  HENT:   Head: Normocephalic and atraumatic.   Right Ear: External ear normal.   Left Ear: External ear normal.   Mouth/Throat: Oropharynx is clear and moist.   Eyes: Right eye exhibits no discharge. Left eye exhibits no discharge. No scleral icterus.   Neck: Neck normal appearance.  Pulmonary/Chest: Effort normal. No accessory muscle usage.  No respiratory distress.No use of oxygen by nasal cannula  Abdominal: Abdomen appears normal.   Neurological: She is alert and oriented to person, place, and time.   Skin: Skin is dry. She is not diaphoretic. No erythema. No pallor.   Psychiatric: She has a normal mood and affect. Her speech is normal and behavior is normal. Judgment normal. She is attentive.         Vitals:    02/26/25 1225   Weight: 74.8 kg (165 lb)   Height: 157.5 cm (62\")     Body mass index is 30.18 kg/m².        Assessment & Plan     Diagnoses and all orders for this visit:    1. Obesity (BMI 30.0-34.9) (Primary)  -     Semaglutide-Weight Management 0.25 MG/0.5ML solution auto-injector; Inject 0.5 mL under the skin into the appropriate area as directed Every 7 (Seven) Days.  Dispense: 2 mL; Refill: 1        Assessment & Plan  1. Weight loss.  Her gastric emptying study results were borderline, suggesting potential gastroparesis. She has been informed of the associated risks and is aware of her baseline gastrointestinal symptoms. A prescription for Wegovy has been provided, starting with a dosage of 0.25 mg once weekly for a duration of 4 weeks. " The dosage will then be increased to 0.5 mg once weekly for the subsequent 4 weeks, followed by an increase to 1 mg once weekly for another 4 weeks, and finally, a maintenance dose of 1.7 mg. She has been advised to discontinue the medication if she experiences any exacerbation of her gastrointestinal symptoms. She has been instructed to monitor her weight and report any significant changes.    Follow-up  The patient is scheduled for a follow-up visit in 4 weeks, either in person or via video consultation.           No follow-ups on file.    I have reviewed the limitations of a telehealth visit (such as lack of a physical exam and unable to obtain vital signs) and advised the patient that they may need to follow up for an office visit should their symptoms or concerns persist, worsen, or change.  Patient was encouraged to keep me informed of any acute changes, lack of improvement, or any new concerning symptoms.   I discussed my findings,recommendations, and plan of care was with the patient. They verbalized understanding and agreement.    Patient or patient representative verbalized consent for the use of Ambient Listening during the visit with  RIP Tillman for chart documentation. 3/2/2025  21:05 EST

## 2025-02-27 ENCOUNTER — PATIENT MESSAGE (OUTPATIENT)
Dept: INTERNAL MEDICINE | Facility: CLINIC | Age: 50
End: 2025-02-27
Payer: COMMERCIAL

## 2025-02-27 DIAGNOSIS — M25.50 ARTHRALGIA, UNSPECIFIED JOINT: ICD-10-CM

## 2025-02-27 RX ORDER — NALTREXONE 100 %
POWDER (GRAM) MISCELLANEOUS
Qty: 30 G | Refills: 4 | OUTPATIENT
Start: 2025-02-27

## 2025-02-28 ENCOUNTER — TELEPHONE (OUTPATIENT)
Dept: INTERNAL MEDICINE | Facility: CLINIC | Age: 50
End: 2025-02-28
Payer: COMMERCIAL

## 2025-02-28 RX ORDER — NALTREXONE 100 %
1 POWDER (GRAM) MISCELLANEOUS DAILY
Qty: 30 G | Refills: 3 | Status: SHIPPED | OUTPATIENT
Start: 2025-02-28

## 2025-02-28 RX ORDER — NALTREXONE 100 %
1 POWDER (GRAM) MISCELLANEOUS DAILY
Qty: 30 G | Refills: 3 | Status: SHIPPED | OUTPATIENT
Start: 2025-02-28 | End: 2025-02-28 | Stop reason: SDUPTHER

## 2025-02-28 NOTE — TELEPHONE ENCOUNTER
Vick does not have a script for naltrexone.  The script in chart looks to be compound and if so will need to go to a compounding pharmacy per Vick.  Patient sent my chart message saying she does not have any refills on this script.

## 2025-04-11 ENCOUNTER — PATIENT MESSAGE (OUTPATIENT)
Dept: INTERNAL MEDICINE | Facility: CLINIC | Age: 50
End: 2025-04-11
Payer: COMMERCIAL

## 2025-04-11 DIAGNOSIS — B37.9 ANTIBIOTIC-INDUCED YEAST INFECTION: Primary | ICD-10-CM

## 2025-04-11 DIAGNOSIS — T36.95XA ANTIBIOTIC-INDUCED YEAST INFECTION: Primary | ICD-10-CM

## 2025-04-11 RX ORDER — FLUCONAZOLE 150 MG/1
150 TABLET ORAL ONCE
Qty: 1 TABLET | Refills: 0 | Status: SHIPPED | OUTPATIENT
Start: 2025-04-11 | End: 2025-04-11

## 2025-04-11 RX ORDER — FLUCONAZOLE 100 MG/1
100 TABLET ORAL DAILY PRN
OUTPATIENT
Start: 2025-04-11

## 2025-05-05 DIAGNOSIS — F41.1 GAD (GENERALIZED ANXIETY DISORDER): ICD-10-CM

## 2025-05-06 RX ORDER — BUPROPION HYDROCHLORIDE 300 MG/1
300 TABLET ORAL DAILY
Qty: 30 TABLET | Refills: 0 | Status: SHIPPED | OUTPATIENT
Start: 2025-05-06

## 2025-06-01 DIAGNOSIS — H81.09 MENIERE'S DISEASE, UNSPECIFIED LATERALITY: ICD-10-CM

## 2025-06-01 DIAGNOSIS — I10 PRIMARY HYPERTENSION: ICD-10-CM

## 2025-06-01 DIAGNOSIS — J22 ACUTE RESPIRATORY INFECTION: ICD-10-CM

## 2025-06-01 RX ORDER — HYDROCHLOROTHIAZIDE 25 MG/1
25 TABLET ORAL DAILY
Qty: 90 TABLET | Refills: 0 | Status: SHIPPED | OUTPATIENT
Start: 2025-06-01

## 2025-06-02 RX ORDER — ALBUTEROL SULFATE 90 UG/1
2 INHALANT RESPIRATORY (INHALATION) EVERY 4 HOURS PRN
Qty: 8.5 G | Refills: 2 | Status: SHIPPED | OUTPATIENT
Start: 2025-06-02

## 2025-06-04 ENCOUNTER — OFFICE VISIT (OUTPATIENT)
Dept: INTERNAL MEDICINE | Facility: CLINIC | Age: 50
End: 2025-06-04
Payer: COMMERCIAL

## 2025-06-04 ENCOUNTER — LAB (OUTPATIENT)
Dept: INTERNAL MEDICINE | Facility: CLINIC | Age: 50
End: 2025-06-04
Payer: COMMERCIAL

## 2025-06-04 VITALS
RESPIRATION RATE: 14 BRPM | HEART RATE: 68 BPM | BODY MASS INDEX: 28.67 KG/M2 | TEMPERATURE: 97.7 F | WEIGHT: 155.8 LBS | SYSTOLIC BLOOD PRESSURE: 116 MMHG | OXYGEN SATURATION: 97 % | HEIGHT: 62 IN | DIASTOLIC BLOOD PRESSURE: 80 MMHG

## 2025-06-04 DIAGNOSIS — F41.1 GAD (GENERALIZED ANXIETY DISORDER): ICD-10-CM

## 2025-06-04 DIAGNOSIS — R73.09 ELEVATED HEMOGLOBIN A1C: ICD-10-CM

## 2025-06-04 DIAGNOSIS — E66.811 OBESITY (BMI 30.0-34.9): ICD-10-CM

## 2025-06-04 DIAGNOSIS — R11.0 NAUSEA: ICD-10-CM

## 2025-06-04 DIAGNOSIS — Z91.013 SHELLFISH ALLERGY: ICD-10-CM

## 2025-06-04 DIAGNOSIS — M79.7 FIBROMYALGIA: ICD-10-CM

## 2025-06-04 DIAGNOSIS — M25.50 ARTHRALGIA, UNSPECIFIED JOINT: ICD-10-CM

## 2025-06-04 DIAGNOSIS — F51.01 PRIMARY INSOMNIA: ICD-10-CM

## 2025-06-04 DIAGNOSIS — I10 PRIMARY HYPERTENSION: ICD-10-CM

## 2025-06-04 DIAGNOSIS — Z91.013 SHELLFISH ALLERGY: Primary | ICD-10-CM

## 2025-06-04 PROCEDURE — 85027 COMPLETE CBC AUTOMATED: CPT | Performed by: NURSE PRACTITIONER

## 2025-06-04 PROCEDURE — 80061 LIPID PANEL: CPT | Performed by: NURSE PRACTITIONER

## 2025-06-04 PROCEDURE — 85652 RBC SED RATE AUTOMATED: CPT | Performed by: NURSE PRACTITIONER

## 2025-06-04 PROCEDURE — 83036 HEMOGLOBIN GLYCOSYLATED A1C: CPT | Performed by: NURSE PRACTITIONER

## 2025-06-04 PROCEDURE — 80053 COMPREHEN METABOLIC PANEL: CPT | Performed by: NURSE PRACTITIONER

## 2025-06-04 PROCEDURE — 86140 C-REACTIVE PROTEIN: CPT | Performed by: NURSE PRACTITIONER

## 2025-06-04 RX ORDER — FEXOFENADINE HYDROCHLORIDE 180 MG/1
1 TABLET, FILM COATED ORAL DAILY
COMMUNITY
Start: 2025-06-01

## 2025-06-04 RX ORDER — NALTREXONE 100 %
1 POWDER (GRAM) MISCELLANEOUS DAILY
Qty: 30 G | Refills: 3 | Status: SHIPPED | OUTPATIENT
Start: 2025-06-04

## 2025-06-04 RX ORDER — PROMETHAZINE HYDROCHLORIDE 25 MG/1
25 TABLET ORAL EVERY 6 HOURS PRN
Qty: 30 TABLET | Refills: 0 | Status: SHIPPED | OUTPATIENT
Start: 2025-06-04

## 2025-06-04 RX ORDER — ESTRADIOL 0.04 MG/D
1 PATCH, EXTENDED RELEASE TRANSDERMAL 2 TIMES WEEKLY
COMMUNITY
Start: 2025-04-27

## 2025-06-04 RX ORDER — EPINEPHRINE 0.3 MG/.3ML
0.3 INJECTION SUBCUTANEOUS ONCE AS NEEDED
Qty: 1 EACH | Refills: 1 | Status: SHIPPED | OUTPATIENT
Start: 2025-06-04

## 2025-06-04 RX ORDER — TRAZODONE HYDROCHLORIDE 50 MG/1
50-100 TABLET ORAL
Qty: 60 TABLET | Refills: 6 | Status: SHIPPED | OUTPATIENT
Start: 2025-06-04

## 2025-06-04 RX ORDER — BROMPHENIRAMINE MALEATE, PSEUDOEPHEDRINE HYDROCHLORIDE, AND DEXTROMETHORPHAN HYDROBROMIDE 2; 30; 10 MG/5ML; MG/5ML; MG/5ML
5 SYRUP ORAL 4 TIMES DAILY PRN
COMMUNITY
Start: 2025-06-01

## 2025-06-04 RX ORDER — ONDANSETRON 4 MG/1
4 TABLET, ORALLY DISINTEGRATING ORAL EVERY 8 HOURS PRN
Qty: 30 TABLET | Refills: 1 | Status: SHIPPED | OUTPATIENT
Start: 2025-06-04

## 2025-06-04 NOTE — PROGRESS NOTES
Stacia Gomez presents to [facility] for     Chief Complaint  Chief Complaint   Patient presents with    Hypertension    Insomnia    Palpitations    Anxiety       PCP:  Cheryl Velasquez APRN    Subjective          Annual or follow up  Symptoms are: recurrent.   Onset was 1 to 6 months.   Symptoms occur: daily.  Symptoms include: joint pain, change in stool, nasal congestion, diaphoresis, headaches, nausea, neck pain and weakness.   Pertinent negative symptoms include no abdominal pain, no anorexia, no chest pain, no chills, no cough, no fatigue, no fever, no joint swelling, no myalgias, no numbness, no rash, no sore throat, no swollen glands, no dysuria, no vertigo, no visual change and no vomiting.      Has taken 2 doses of her GLP-1. Tolerating well,. Some nausea on day 1 / has lost 10 lbs    Aching joints. Natrexone compounded low dose helps greatly with this.   Pain worse if she misses a dose.     Feeling anxious at times.   Reports trazodone working well for sleep      Needs refill on her epi pen for severe allergies.     Health Maintenance:   Health Maintenance   Topic Date Due    HEPATITIS C SCREENING  Never done    ANNUAL PHYSICAL  08/22/2024    COVID-19 Vaccine (1 - 2024-25 season) Never done    TDAP/TD VACCINES (1 - Tdap) 08/26/2025 (Originally 6/25/1996)    INFLUENZA VACCINE  07/01/2025    MAMMOGRAM  10/03/2025    COLORECTAL CANCER SCREENING  01/01/2033    Pneumococcal Vaccine 0-49  Aged Out             Review of Systems   Constitutional:  Positive for diaphoresis. Negative for chills, fatigue, fever and unexpected weight loss.   HENT:  Positive for congestion. Negative for sore throat and swollen glands.    Eyes:  Negative for blurred vision, double vision and visual disturbance.   Respiratory:  Negative for cough, shortness of breath and wheezing.    Cardiovascular:  Negative for chest pain, palpitations and leg swelling.   Gastrointestinal:  Positive for nausea.  Negative for abdominal pain, anorexia, constipation, diarrhea and vomiting.   Genitourinary:  Negative for difficulty urinating, dysuria, frequency and urgency.   Musculoskeletal:  Positive for joint pain and neck pain. Negative for arthralgias and myalgias.   Skin:  Negative for color change and rash.   Neurological:  Positive for weakness. Negative for dizziness, vertigo, numbness and headache.   Hematological:  Negative for adenopathy. Does not bruise/bleed easily.         Allergies   Allergen Reactions    Dog Epithelium (Canis Lupus Familiaris) Hives and Itching    Prunus Persica Hives and Itching    Shellfish Allergy Anaphylaxis     Coughing, hives, carry Epipen    Alimentum Hives    Lemon Oil Hives    Mangifera Indica Hives    Pineapple Hives    Tomato Hives    Toradol [Ketorolac Tromethamine] Unknown - High Severity     Previous reaction with anesthesia    Amoxicillin Hives    Baclofen Other (See Comments)     Nervousness/jitters    Peanut Oil Itching    Ascorbate Rash    Strawberry Itching and Rash     Current Outpatient Medications on File Prior to Visit   Medication Sig Dispense Refill    albuterol sulfate  (90 Base) MCG/ACT inhaler INHALE 2 PUFFS EVERY 4 HOURS AS NEEDED FOR WHEEZING 8.5 g 2    Allergy Relief 180 MG tablet Take 1 tablet by mouth Daily.      brompheniramine-pseudoephedrine-DM 30-2-10 MG/5ML syrup Take 5 mL by mouth 4 (Four) Times a Day As Needed for Congestion or Cough.      buPROPion XL (WELLBUTRIN XL) 300 MG 24 hr tablet TAKE 1 TABLET BY MOUTH DAILY 30 tablet 0    dicyclomine (BENTYL) 20 MG tablet Take 1 tablet by mouth As Needed.      famotidine (PEPCID) 20 MG tablet Take 1 tablet by mouth 2 (Two) Times a Day As Needed.      hydroCHLOROthiazide 25 MG tablet TAKE 1 TABLET BY MOUTH DAILY 90 tablet 0    hydrOXYzine (ATARAX) 10 MG tablet Take 1 tablet by mouth 2 (Two) Times a Day As Needed for Anxiety.      metoprolol tartrate (LOPRESSOR) 25 MG tablet TAKE 1 TABLET BY MOUTH TWICE  "DAILY 180 tablet 0    ondansetron (ZOFRAN) 4 MG tablet       Qulipta 60 MG tablet Take 1 tablet by mouth Daily.      Ubrelvy 100 MG tablet Take 1 tablet by mouth 2 (Two) Times a Day As Needed.      estradiol (VIVELLE-DOT) 0.0375 MG/24HR patch Place 1 patch on the skin as directed by provider 2 (Two) Times a Week. (Patient not taking: Reported on 6/4/2025)       No current facility-administered medications on file prior to visit.         The following portions of the patient's history were reviewed and updated as appropriate: allergies, current medications, past family history, past medical history, past social history, past surgical history and problem list and are available for review within electronic record    Objective     Result Review :                    Vital Signs:   /80 (BP Location: Right arm, Patient Position: Sitting, Cuff Size: Adult)   Pulse 68   Temp 97.7 °F (36.5 °C) (Infrared)   Resp 14   Ht 157.5 cm (62\")   Wt 70.7 kg (155 lb 12.8 oz)   SpO2 97%   BMI 28.50 kg/m²         Physical Exam  Constitutional:       Appearance: Normal appearance. She is well-developed.   HENT:      Head: Normocephalic and atraumatic.   Eyes:      General: No scleral icterus.     Conjunctiva/sclera: Conjunctivae normal.   Cardiovascular:      Rate and Rhythm: Normal rate and regular rhythm.      Heart sounds: Normal heart sounds.   Pulmonary:      Effort: Pulmonary effort is normal. No respiratory distress.      Breath sounds: Normal breath sounds.   Abdominal:      General: Bowel sounds are normal.      Palpations: Abdomen is soft.      Tenderness: There is no abdominal tenderness.   Musculoskeletal:         General: Normal range of motion.      Cervical back: Normal range of motion.      Right lower leg: No edema.      Left lower leg: No edema.   Skin:     General: Skin is warm and dry.   Neurological:      General: No focal deficit present.      Mental Status: She is alert and oriented to person, place, and " time.   Psychiatric:         Attention and Perception: Attention normal.         Mood and Affect: Mood and affect normal.         Behavior: Behavior normal. Behavior is cooperative.         Thought Content: Thought content normal.         Cognition and Memory: Cognition normal.         Judgment: Judgment normal.                 Assessment and Plan      Diagnoses and all orders for this visit:    1. Shellfish allergy (Primary)  -     EPINEPHrine (EPIPEN) 0.3 MG/0.3ML solution auto-injector injection; Inject 0.3 mL into the appropriate muscle as directed by prescriber 1 (One) Time As Needed (severe allergic reaction).  Dispense: 1 each; Refill: 1  -     CBC (No Diff); Future  -     Comprehensive Metabolic Panel; Future  -     Cancel: Lipid Panel; Future  -     Hemoglobin A1c; Future  -     Lipid Panel; Future  Epi pen for PRN use  2. Nausea  -     promethazine (PHENERGAN) 25 MG tablet; Take 1 tablet by mouth Every 6 (Six) Hours As Needed for Nausea or Vomiting.  Dispense: 30 tablet; Refill: 0  -     ondansetron ODT (ZOFRAN-ODT) 4 MG disintegrating tablet; Place 1 tablet on the tongue Every 8 (Eight) Hours As Needed for Nausea or Vomiting.  Dispense: 30 tablet; Refill: 1  -     CBC (No Diff); Future  -     Comprehensive Metabolic Panel; Future  -     Cancel: Lipid Panel; Future  -     Hemoglobin A1c; Future  -     Lipid Panel; Future  Phenergan and zofran prn. Has been using phenergan with nausea related to migraines,but uses the zofran for GLP-1 associated mild nausea.     3. Obesity (BMI 30.0-34.9)  -     Semaglutide-Weight Management 0.25 MG/0.5ML solution auto-injector; Inject 0.5 mL under the skin into the appropriate area as directed Every 7 (Seven) Days.  Dispense: 2 mL; Refill: 1  -     CBC (No Diff); Future  -     Comprehensive Metabolic Panel; Future  -     Cancel: Lipid Panel; Future  -     Hemoglobin A1c; Future  -     Lipid Panel; Future  Cont wegovy. High protein diet. Increase physical activity as  tolerated.     4. Arthralgia, unspecified joint  -     C-reactive Protein; Future  -     Sedimentation Rate; Future  -     CBC (No Diff); Future  -     Comprehensive Metabolic Panel; Future  -     Cancel: Lipid Panel; Future  -     Hemoglobin A1c; Future  -     Lipid Panel; Future  -     Naltrexone powder; Use 1 Units Daily. Naltrexone 100% powder Compounded 2mg low dose naltrexone compounded lactose free 2mg, 1 po qd Dispense quantity 30 gram with 3 refills  Dispense: 30 g; Refill: 3  Cont naltrexone powder- works well for her pain    5. Elevated hemoglobin A1c  -     CBC (No Diff); Future  -     Comprehensive Metabolic Panel; Future  -     Cancel: Lipid Panel; Future  -     Hemoglobin A1c; Future  -     Lipid Panel; Future  Recheck A1c. Cont weight loss efforts.   6. KEVIN (generalized anxiety disorder)  Cont wellbutrin  7. Primary hypertension  Well controlled.   8. Fibromyalgia  -     Naltrexone powder; Use 1 Units Daily. Naltrexone 100% powder Compounded 2mg low dose naltrexone compounded lactose free 2mg, 1 po qd Dispense quantity 30 gram with 3 refills  Dispense: 30 g; Refill: 3    9. Primary insomnia  -     traZODone (DESYREL) 50 MG tablet; Take 1-2 tablets by mouth every night at bedtime.  Dispense: 60 tablet; Refill: 6  Stable with current Rx. Cont trazodone      Follow Up     Patient was given instructions and counseling regarding her condition or for health maintenance advice. Please see specific information pulled into the AVS if appropriate.   Any new medication's adverse effects have been discussed in detail with patient  Patient was encouraged to keep me informed of any acute changes, lack of improvement, or any new concerning symptoms.    Return in about 3 months (around 9/4/2025) for Annual.          Dictated Utilizing Dragon Dictation   Please note that portions of this note were completed with a voice recognition program.   Part of this note may be an electronic transcription/translation of spoken  language to printed text using the Dragon Dictation System.

## 2025-06-05 LAB
ALBUMIN SERPL-MCNC: 4.2 G/DL (ref 3.5–5.2)
ALBUMIN/GLOB SERPL: 1.1 G/DL
ALP SERPL-CCNC: 75 U/L (ref 39–117)
ALT SERPL W P-5'-P-CCNC: 12 U/L (ref 1–33)
ANION GAP SERPL CALCULATED.3IONS-SCNC: 11 MMOL/L (ref 5–15)
AST SERPL-CCNC: 19 U/L (ref 1–32)
BILIRUB SERPL-MCNC: 0.6 MG/DL (ref 0–1.2)
BUN SERPL-MCNC: 12 MG/DL (ref 6–20)
BUN/CREAT SERPL: 12.4 (ref 7–25)
CALCIUM SPEC-SCNC: 9.7 MG/DL (ref 8.6–10.5)
CHLORIDE SERPL-SCNC: 98 MMOL/L (ref 98–107)
CHOLEST SERPL-MCNC: 188 MG/DL (ref 0–200)
CO2 SERPL-SCNC: 27 MMOL/L (ref 22–29)
CREAT SERPL-MCNC: 0.97 MG/DL (ref 0.57–1)
CRP SERPL-MCNC: 4.5 MG/DL (ref 0–0.5)
DEPRECATED RDW RBC AUTO: 36.4 FL (ref 37–54)
EGFRCR SERPLBLD CKD-EPI 2021: 72.7 ML/MIN/1.73
ERYTHROCYTE [DISTWIDTH] IN BLOOD BY AUTOMATED COUNT: 11.4 % (ref 12.3–15.4)
ERYTHROCYTE [SEDIMENTATION RATE] IN BLOOD: 48 MM/HR (ref 0–20)
GLOBULIN UR ELPH-MCNC: 3.8 GM/DL
GLUCOSE SERPL-MCNC: 82 MG/DL (ref 65–99)
HBA1C MFR BLD: 5.4 % (ref 4.8–5.6)
HCT VFR BLD AUTO: 40.1 % (ref 34–46.6)
HDLC SERPL-MCNC: 79 MG/DL (ref 40–60)
HGB BLD-MCNC: 13.6 G/DL (ref 12–15.9)
LDLC SERPL CALC-MCNC: 96 MG/DL (ref 0–100)
LDLC/HDLC SERPL: 1.21 {RATIO}
MCH RBC QN AUTO: 30.2 PG (ref 26.6–33)
MCHC RBC AUTO-ENTMCNC: 33.9 G/DL (ref 31.5–35.7)
MCV RBC AUTO: 89.1 FL (ref 79–97)
PLATELET # BLD AUTO: 383 10*3/MM3 (ref 140–450)
PMV BLD AUTO: 10 FL (ref 6–12)
POTASSIUM SERPL-SCNC: 4 MMOL/L (ref 3.5–5.2)
PROT SERPL-MCNC: 8 G/DL (ref 6–8.5)
RBC # BLD AUTO: 4.5 10*6/MM3 (ref 3.77–5.28)
SODIUM SERPL-SCNC: 136 MMOL/L (ref 136–145)
TRIGL SERPL-MCNC: 68 MG/DL (ref 0–150)
VLDLC SERPL-MCNC: 13 MG/DL (ref 5–40)
WBC NRBC COR # BLD AUTO: 5.91 10*3/MM3 (ref 3.4–10.8)

## 2025-06-19 DIAGNOSIS — F41.1 GAD (GENERALIZED ANXIETY DISORDER): ICD-10-CM

## 2025-06-19 RX ORDER — BUPROPION HYDROCHLORIDE 300 MG/1
300 TABLET ORAL DAILY
Qty: 90 TABLET | Refills: 0 | Status: SHIPPED | OUTPATIENT
Start: 2025-06-19

## 2025-06-19 NOTE — TELEPHONE ENCOUNTER
Rx Refill Note  Requested Prescriptions     Pending Prescriptions Disp Refills    buPROPion XL (WELLBUTRIN XL) 300 MG 24 hr tablet [Pharmacy Med Name: BUPROPION XL 300MG TABLETS] 90 tablet 0     Sig: TAKE 1 TABLET BY MOUTH DAILY      Last office visit with prescribing clinician: 6/4/2025   Last telemedicine visit with prescribing clinician: 2/26/2025   Next office visit with prescribing clinician: 9/8/2025       Stefany Tolbert  06/19/25, 10:11 EDT

## 2025-07-01 RX ORDER — FLUCONAZOLE 150 MG/1
150 TABLET ORAL DAILY
Qty: 1 TABLET | Refills: 0 | OUTPATIENT
Start: 2025-07-01 | End: 2025-07-02

## 2025-07-02 PROBLEM — K31.84 GASTROPARESIS: Status: ACTIVE | Noted: 2024-12-10

## 2025-07-02 PROBLEM — Z91.018 ALLERGY TO OTHER FOODS: Status: ACTIVE | Noted: 2021-09-24

## 2025-07-02 PROBLEM — N83.209 CYST OF OVARY: Status: ACTIVE | Noted: 2022-12-05

## 2025-07-02 PROBLEM — R79.89 ELEVATED PLATELET COUNT: Status: ACTIVE | Noted: 2021-08-18

## 2025-07-02 PROBLEM — K83.8 BILIARY SLUDGE: Status: ACTIVE | Noted: 2021-09-24

## 2025-07-02 PROBLEM — R60.9 LIPEDEMA: Status: ACTIVE | Noted: 2022-01-25

## 2025-07-02 PROBLEM — N80.9 ENDOMETRIOSIS: Status: ACTIVE | Noted: 2022-12-05

## 2025-07-02 PROBLEM — R79.89 POSITIVE D-DIMER: Status: ACTIVE | Noted: 2021-09-28

## 2025-07-02 PROBLEM — M79.10 MYALGIA: Status: ACTIVE | Noted: 2023-05-17

## 2025-07-02 PROBLEM — D21.9 FIBROIDS: Status: ACTIVE | Noted: 2022-12-05

## 2025-07-02 PROBLEM — T78.40XA ALLERGIC REACTION: Status: ACTIVE | Noted: 2022-01-24

## 2025-07-02 PROBLEM — F41.0 PANIC ATTACK: Status: ACTIVE | Noted: 2020-03-03

## 2025-07-02 PROBLEM — R91.8 MULTIPLE PULMONARY NODULES: Status: ACTIVE | Noted: 2021-08-18

## 2025-07-02 PROBLEM — M25.60 JOINT STIFFNESS: Status: ACTIVE | Noted: 2023-05-17

## 2025-07-02 PROBLEM — N64.4 BREAST PAIN: Status: ACTIVE | Noted: 2022-12-05

## 2025-07-02 PROBLEM — G44.89 OTHER HEADACHE SYNDROME: Status: ACTIVE | Noted: 2023-05-17

## 2025-07-03 ENCOUNTER — PATIENT ROUNDING (BHMG ONLY) (OUTPATIENT)
Dept: URGENT CARE | Facility: CLINIC | Age: 50
End: 2025-07-03
Payer: COMMERCIAL

## 2025-07-11 ENCOUNTER — HOSPITAL ENCOUNTER (EMERGENCY)
Facility: HOSPITAL | Age: 50
Discharge: HOME | End: 2025-07-11
Attending: STUDENT IN AN ORGANIZED HEALTH CARE EDUCATION/TRAINING PROGRAM
Payer: COMMERCIAL

## 2025-07-11 ENCOUNTER — APPOINTMENT (OUTPATIENT)
Facility: HOSPITAL | Age: 50
End: 2025-07-11
Payer: COMMERCIAL

## 2025-07-11 VITALS
TEMPERATURE: 98.2 F | BODY MASS INDEX: 28.71 KG/M2 | RESPIRATION RATE: 18 BRPM | HEIGHT: 62 IN | DIASTOLIC BLOOD PRESSURE: 79 MMHG | SYSTOLIC BLOOD PRESSURE: 138 MMHG | OXYGEN SATURATION: 99 % | WEIGHT: 156 LBS | HEART RATE: 50 BPM

## 2025-07-11 DIAGNOSIS — R10.31 ACUTE RIGHT LOWER QUADRANT PAIN: ICD-10-CM

## 2025-07-11 DIAGNOSIS — N83.209 HEMORRHAGIC OVARIAN CYST: Primary | ICD-10-CM

## 2025-07-11 LAB
ALBUMIN SERPL-MCNC: 3.3 G/DL (ref 3.5–5.2)
ALBUMIN/GLOB SERPL: 1.3 G/DL
ALP SERPL-CCNC: 55 U/L (ref 39–117)
ALT SERPL W P-5'-P-CCNC: 10 U/L (ref 1–33)
ANION GAP SERPL CALCULATED.3IONS-SCNC: 9.8 MMOL/L (ref 5–15)
AST SERPL-CCNC: 15 U/L (ref 1–32)
BASOPHILS # BLD AUTO: 0.01 10*3/MM3 (ref 0–0.2)
BASOPHILS NFR BLD AUTO: 0.2 % (ref 0–1.5)
BILIRUB SERPL-MCNC: 0.3 MG/DL (ref 0–1.2)
BILIRUB UR QL STRIP: NEGATIVE
BUN SERPL-MCNC: 10.8 MG/DL (ref 6–20)
BUN/CREAT SERPL: 13.5 (ref 7–25)
CALCIUM SPEC-SCNC: 7.6 MG/DL (ref 8.6–10.5)
CHLORIDE SERPL-SCNC: 109 MMOL/L (ref 98–107)
CLARITY UR: CLEAR
CO2 SERPL-SCNC: 22.2 MMOL/L (ref 22–29)
COLOR UR: YELLOW
CREAT SERPL-MCNC: 0.8 MG/DL (ref 0.57–1)
D-LACTATE SERPL-SCNC: 0.7 MMOL/L (ref 0.5–2)
DEPRECATED RDW RBC AUTO: 44.7 FL (ref 37–54)
EGFRCR SERPLBLD CKD-EPI 2021: 91 ML/MIN/1.73
EOSINOPHIL # BLD AUTO: 0.08 10*3/MM3 (ref 0–0.4)
EOSINOPHIL NFR BLD AUTO: 1.7 % (ref 0.3–6.2)
ERYTHROCYTE [DISTWIDTH] IN BLOOD BY AUTOMATED COUNT: 13 % (ref 12.3–15.4)
GLOBULIN UR ELPH-MCNC: 2.6 GM/DL
GLUCOSE SERPL-MCNC: 68 MG/DL (ref 65–99)
GLUCOSE UR STRIP-MCNC: NEGATIVE MG/DL
HCT VFR BLD AUTO: 39.5 % (ref 34–46.6)
HGB BLD-MCNC: 12.4 G/DL (ref 12–15.9)
HGB UR QL STRIP.AUTO: NEGATIVE
HOLD SPECIMEN: NORMAL
IMM GRANULOCYTES # BLD AUTO: 0 10*3/MM3 (ref 0–0.05)
IMM GRANULOCYTES NFR BLD AUTO: 0 % (ref 0–0.5)
KETONES UR QL STRIP: NEGATIVE
LEUKOCYTE ESTERASE UR QL STRIP.AUTO: NEGATIVE
LIPASE SERPL-CCNC: 23 U/L (ref 13–60)
LYMPHOCYTES # BLD AUTO: 1.58 10*3/MM3 (ref 0.7–3.1)
LYMPHOCYTES NFR BLD AUTO: 33.1 % (ref 19.6–45.3)
MAGNESIUM SERPL-MCNC: 1.7 MG/DL (ref 1.6–2.6)
MCH RBC QN AUTO: 29.2 PG (ref 26.6–33)
MCHC RBC AUTO-ENTMCNC: 31.4 G/DL (ref 31.5–35.7)
MCV RBC AUTO: 93.2 FL (ref 79–97)
MONOCYTES # BLD AUTO: 0.28 10*3/MM3 (ref 0.1–0.9)
MONOCYTES NFR BLD AUTO: 5.9 % (ref 5–12)
NEUTROPHILS NFR BLD AUTO: 2.82 10*3/MM3 (ref 1.7–7)
NEUTROPHILS NFR BLD AUTO: 59.1 % (ref 42.7–76)
NITRITE UR QL STRIP: NEGATIVE
PH UR STRIP.AUTO: 6.5 [PH] (ref 5–8)
PLATELET # BLD AUTO: 309 10*3/MM3 (ref 140–450)
PMV BLD AUTO: 9.6 FL (ref 6–12)
POTASSIUM SERPL-SCNC: 3.2 MMOL/L (ref 3.5–5.2)
PROT SERPL-MCNC: 5.9 G/DL (ref 6–8.5)
PROT UR QL STRIP: NEGATIVE
RBC # BLD AUTO: 4.24 10*6/MM3 (ref 3.77–5.28)
SODIUM SERPL-SCNC: 141 MMOL/L (ref 136–145)
SP GR UR STRIP: 1.01 (ref 1–1.03)
UROBILINOGEN UR QL STRIP: NORMAL
WBC NRBC COR # BLD AUTO: 4.77 10*3/MM3 (ref 3.4–10.8)
WHOLE BLOOD HOLD COAG: NORMAL
WHOLE BLOOD HOLD SPECIMEN: NORMAL

## 2025-07-11 PROCEDURE — 96375 TX/PRO/DX INJ NEW DRUG ADDON: CPT

## 2025-07-11 PROCEDURE — 25810000003 SODIUM CHLORIDE 0.9 % SOLUTION: Performed by: PHYSICIAN ASSISTANT

## 2025-07-11 PROCEDURE — 85025 COMPLETE CBC W/AUTO DIFF WBC: CPT | Performed by: PHYSICIAN ASSISTANT

## 2025-07-11 PROCEDURE — 25010000002 HYDROMORPHONE PER 4 MG: Performed by: STUDENT IN AN ORGANIZED HEALTH CARE EDUCATION/TRAINING PROGRAM

## 2025-07-11 PROCEDURE — 93976 VASCULAR STUDY: CPT

## 2025-07-11 PROCEDURE — 76830 TRANSVAGINAL US NON-OB: CPT

## 2025-07-11 PROCEDURE — 99285 EMERGENCY DEPT VISIT HI MDM: CPT | Performed by: STUDENT IN AN ORGANIZED HEALTH CARE EDUCATION/TRAINING PROGRAM

## 2025-07-11 PROCEDURE — 83605 ASSAY OF LACTIC ACID: CPT | Performed by: PHYSICIAN ASSISTANT

## 2025-07-11 PROCEDURE — 80053 COMPREHEN METABOLIC PANEL: CPT | Performed by: PHYSICIAN ASSISTANT

## 2025-07-11 PROCEDURE — 81003 URINALYSIS AUTO W/O SCOPE: CPT | Performed by: PHYSICIAN ASSISTANT

## 2025-07-11 PROCEDURE — 74177 CT ABD & PELVIS W/CONTRAST: CPT

## 2025-07-11 PROCEDURE — 25010000002 MORPHINE PER 10 MG: Performed by: STUDENT IN AN ORGANIZED HEALTH CARE EDUCATION/TRAINING PROGRAM

## 2025-07-11 PROCEDURE — 25510000001 IOPAMIDOL 61 % SOLUTION: Performed by: STUDENT IN AN ORGANIZED HEALTH CARE EDUCATION/TRAINING PROGRAM

## 2025-07-11 PROCEDURE — 83735 ASSAY OF MAGNESIUM: CPT | Performed by: PHYSICIAN ASSISTANT

## 2025-07-11 PROCEDURE — 83690 ASSAY OF LIPASE: CPT | Performed by: PHYSICIAN ASSISTANT

## 2025-07-11 PROCEDURE — 25010000002 ONDANSETRON PER 1 MG: Performed by: PHYSICIAN ASSISTANT

## 2025-07-11 PROCEDURE — 96374 THER/PROPH/DIAG INJ IV PUSH: CPT

## 2025-07-11 RX ORDER — HYDROMORPHONE HYDROCHLORIDE 1 MG/ML
0.5 INJECTION, SOLUTION INTRAMUSCULAR; INTRAVENOUS; SUBCUTANEOUS ONCE
Status: COMPLETED | OUTPATIENT
Start: 2025-07-11 | End: 2025-07-11

## 2025-07-11 RX ORDER — IOPAMIDOL 612 MG/ML
100 INJECTION, SOLUTION INTRAVASCULAR
Status: COMPLETED | OUTPATIENT
Start: 2025-07-11 | End: 2025-07-11

## 2025-07-11 RX ORDER — POTASSIUM CHLORIDE 750 MG/1
40 CAPSULE, EXTENDED RELEASE ORAL ONCE
Status: COMPLETED | OUTPATIENT
Start: 2025-07-11 | End: 2025-07-11

## 2025-07-11 RX ORDER — ONDANSETRON 2 MG/ML
4 INJECTION INTRAMUSCULAR; INTRAVENOUS ONCE
Status: COMPLETED | OUTPATIENT
Start: 2025-07-11 | End: 2025-07-11

## 2025-07-11 RX ORDER — SODIUM CHLORIDE 0.9 % (FLUSH) 0.9 %
10 SYRINGE (ML) INJECTION AS NEEDED
Status: DISCONTINUED | OUTPATIENT
Start: 2025-07-11 | End: 2025-07-11 | Stop reason: HOSPADM

## 2025-07-11 RX ADMIN — IOPAMIDOL 100 ML: 612 INJECTION, SOLUTION INTRAVENOUS at 14:56

## 2025-07-11 RX ADMIN — ONDANSETRON 4 MG: 2 INJECTION, SOLUTION INTRAMUSCULAR; INTRAVENOUS at 14:34

## 2025-07-11 RX ADMIN — POTASSIUM CHLORIDE 40 MEQ: 750 CAPSULE, EXTENDED RELEASE ORAL at 15:40

## 2025-07-11 RX ADMIN — MORPHINE SULFATE 4 MG: 4 INJECTION, SOLUTION INTRAMUSCULAR; INTRAVENOUS at 14:34

## 2025-07-11 RX ADMIN — HYDROMORPHONE HYDROCHLORIDE 0.5 MG: 1 INJECTION, SOLUTION INTRAMUSCULAR; INTRAVENOUS; SUBCUTANEOUS at 15:34

## 2025-07-11 RX ADMIN — SODIUM CHLORIDE 1000 ML: 9 INJECTION, SOLUTION INTRAVENOUS at 15:30

## 2025-07-11 NOTE — FSED PROVIDER NOTE
Subjective  History of Present Illness:    Patient is a 50-year-old female with a medical history of anxiety, depression, fibromyalgia, ovarian cyst presents emergency department for evaluation of right lower quadrant pain that started 3 days ago.  Patient states pain is progressively worsening.  Patient states pain is constant and worsens with movement.  Patient has had nausea without vomiting.  Patient denies fevers.  Patient denies change in bowel movements or urinary symptoms.  Patient reports abdominal surgical history of cholecystectomy and tubal ligation.      Nurses Notes reviewed and agree, including vitals, allergies, social history and prior medical history.     REVIEW OF SYSTEMS: All systems reviewed and not pertinent unless noted.  Review of Systems   Gastrointestinal:  Positive for abdominal pain and nausea.   All other systems reviewed and are negative.      Past Medical History:   Diagnosis Date    Anxiety     Asthma     as a child    Fibromyalgia, primary     GERD (gastroesophageal reflux disease)     Headache     History of medical problems     Meineires Disease    HL (hearing loss) 08/22    Inflammatory bowel disease     Palpitation        Allergies:    Dog epithelium (canis lupus familiaris), Prunus persica, Shellfish allergy, Alimentum, Lemon oil, Mangifera indica, Pineapple, Tomato, Toradol [ketorolac tromethamine], Amoxicillin, Baclofen, Peanut oil, Ascorbate, and Strawberry      Past Surgical History:   Procedure Laterality Date    CHOLECYSTECTOMY  09/2016    COLONOSCOPY  01/2023    ENDOMETRIAL ABLATION      HYSTERECTOMY  01/2014    OVARIAN CYST REMOVAL Left 01/29/2024    TUBAL ABDOMINAL LIGATION  07/1999    TUBAL ABDOMINAL LIGATION Left 01/29/2024         Social History     Socioeconomic History    Marital status:    Tobacco Use    Smoking status: Never     Passive exposure: Never    Smokeless tobacco: Never   Vaping Use    Vaping status: Never Used   Substance and Sexual Activity     "Alcohol use: Yes     Alcohol/week: 3.0 standard drinks of alcohol     Types: 1 Glasses of wine, 1 Shots of liquor, 1 Drinks containing 0.5 oz of alcohol per week    Drug use: Never    Sexual activity: Yes     Partners: Male     Birth control/protection: Tubal ligation, Hysterectomy         Family History   Problem Relation Age of Onset    Arthritis Mother     Hyperlipidemia Mother     Vision loss Mother     Other Father         Amyloidosis    Heart disease Maternal Grandmother     Cancer Sister         Breast cancee       Objective  Physical Exam:  /74   Pulse 55   Temp 98.2 °F (36.8 °C) (Oral)   Resp 18   Ht 157.5 cm (62\")   Wt 70.8 kg (156 lb)   SpO2 98%   BMI 28.53 kg/m²      Physical Exam  Vitals and nursing note reviewed.   Constitutional:       General: She is not in acute distress.     Appearance: Normal appearance. She is not toxic-appearing.   HENT:      Head: Normocephalic and atraumatic.      Nose: Nose normal.      Mouth/Throat:      Mouth: Mucous membranes are moist.   Eyes:      Extraocular Movements: Extraocular movements intact.      Conjunctiva/sclera: Conjunctivae normal.      Pupils: Pupils are equal, round, and reactive to light.   Cardiovascular:      Rate and Rhythm: Normal rate and regular rhythm.      Pulses: Normal pulses.      Heart sounds: Normal heart sounds. No murmur heard.  Pulmonary:      Effort: Pulmonary effort is normal. No respiratory distress.      Breath sounds: Normal breath sounds. No stridor. No wheezing.   Abdominal:      General: Abdomen is flat. There is no distension.      Palpations: Abdomen is soft. There is no mass.      Tenderness: There is abdominal tenderness in the right lower quadrant. There is guarding.      Hernia: No hernia is present.   Musculoskeletal:         General: No deformity. Normal range of motion.      Cervical back: Normal range of motion and neck supple.      Right lower leg: No edema.      Left lower leg: No edema.   Skin:     " General: Skin is warm and dry.      Capillary Refill: Capillary refill takes less than 2 seconds.      Findings: No rash.   Neurological:      General: No focal deficit present.      Mental Status: She is alert and oriented to person, place, and time.      Cranial Nerves: No cranial nerve deficit.      Sensory: No sensory deficit.      Gait: Gait normal.   Psychiatric:         Mood and Affect: Mood normal.         Behavior: Behavior normal.         Procedures    ED Course:         Lab Results (last 24 hours)       Procedure Component Value Units Date/Time    CBC & Differential [[id number]]  (Abnormal) Collected: 07/11/25 1411    Specimen: Blood Updated: 07/11/25 1416    Narrative:      The following orders were created for panel order CBC & Differential.  Procedure                               Abnormality         Status                     ---------                               -----------         ------                     CBC Auto Differential[[id number]]        Abnormal            Final result                 Please view results for these tests on the individual orders.    Comprehensive Metabolic Panel [[id number]]  (Abnormal) Collected: 07/11/25 1411    Specimen: Blood Updated: 07/11/25 1433     Glucose 68 mg/dL      BUN 10.8 mg/dL      Creatinine 0.80 mg/dL      Sodium 141 mmol/L      Potassium 3.2 mmol/L      Chloride 109 mmol/L      CO2 22.2 mmol/L      Calcium 7.6 mg/dL      Total Protein 5.9 g/dL      Albumin 3.3 g/dL      ALT (SGPT) 10 U/L      AST (SGOT) 15 U/L      Alkaline Phosphatase 55 U/L      Total Bilirubin 0.3 mg/dL      Globulin 2.6 gm/dL      A/G Ratio 1.3 g/dL      BUN/Creatinine Ratio 13.5     Anion Gap 9.8 mmol/L      eGFR 91.0 mL/min/1.73     Narrative:      GFR Categories in Chronic Kidney Disease (CKD)              GFR Category          GFR (mL/min/1.73)    Interpretation  G1                    90 or greater        Normal or high (1)  G2                    60-89                Mild decrease  (1)  G3a                   45-59                Mild to moderate decrease  G3b                   30-44                Moderate to severe decrease  G4                    15-29                Severe decrease  G5                    14 or less           Kidney failure    (1)In the absence of evidence of kidney disease, neither GFR category G1 or G2 fulfill the criteria for CKD.    eGFR calculation 2021 CKD-EPI creatinine equation, which does not include race as a factor    Lipase [[id number]]  (Normal) Collected: 07/11/25 1411    Specimen: Blood Updated: 07/11/25 1433     Lipase 23 U/L     CBC Auto Differential [[id number]]  (Abnormal) Collected: 07/11/25 1411    Specimen: Blood Updated: 07/11/25 1416     WBC 4.77 10*3/mm3      RBC 4.24 10*6/mm3      Hemoglobin 12.4 g/dL      Hematocrit 39.5 %      MCV 93.2 fL      MCH 29.2 pg      MCHC 31.4 g/dL      RDW 13.0 %      RDW-SD 44.7 fl      MPV 9.6 fL      Platelets 309 10*3/mm3      Neutrophil % 59.1 %      Lymphocyte % 33.1 %      Monocyte % 5.9 %      Eosinophil % 1.7 %      Basophil % 0.2 %      Immature Grans % 0.0 %      Neutrophils, Absolute 2.82 10*3/mm3      Lymphocytes, Absolute 1.58 10*3/mm3      Monocytes, Absolute 0.28 10*3/mm3      Eosinophils, Absolute 0.08 10*3/mm3      Basophils, Absolute 0.01 10*3/mm3      Immature Grans, Absolute 0.00 10*3/mm3     Lactic Acid, Plasma [[id number]]  (Normal) Collected: 07/11/25 1411    Specimen: Blood Updated: 07/11/25 1434     Lactate 0.7 mmol/L     Magnesium [[id number]]  (Normal) Collected: 07/11/25 1411    Specimen: Blood Updated: 07/11/25 1452     Magnesium 1.7 mg/dL     Urinalysis With Microscopic If Indicated (No Culture) - Urine, Clean Catch [[id number]]  (Normal) Collected: 07/11/25 1525    Specimen: Urine, Clean Catch Updated: 07/11/25 1531     Color, UA Yellow     Appearance, UA Clear     pH, UA 6.5     Specific Gravity, UA 1.010     Glucose, UA Negative     Ketones, UA Negative     Bilirubin, UA Negative     Blood,  UA Negative     Protein, UA Negative     Leuk Esterase, UA Negative     Nitrite, UA Negative     Urobilinogen, UA 0.2 E.U./dL    Narrative:      Urine microscopic not indicated.             US Non-ob Transvaginal  Result Date: 7/11/2025  US NON-OB TRANSVAGINAL, US TESTICULAR OR OVARIAN VASCULAR LIMITED Date of Exam: 7/11/2025 2:57 PM EDT Indication: RLQ pain, hx ovarian cyst. Comparison: CT same day Technique: Transvaginal pelvic ultrasound was performed.  Grayscale and color Doppler imaging was utilized to evaluate the pelvic area with image documentation per protocol.  Doppler spectral analysis was performed. FINDINGS: Measurements: Patient is status post hysterectomy Right Ovary:  2.3 x 1.0 x 1.1 cm. Left Ovary: 3.2 x 2.0 x 1.8 cm. Ultrasound Findings: Patient is status post hysterectomy. Right Ovary: Right ovary is within normal limits.  There is normal arterial and venous Doppler flow. Left Ovary:  Simple appearing cyst, follicle noted measuring approximately 1.3 cm. Small complex structure measuring 1.8 1.5 x 1.5 cm representing involuting corpus luteal cyst or small hemorrhagic cyst. There is normal arterial and venous Doppler flow. Free Fluid: No evidence of free fluid.     Impression: 1.Status post hysterectomy.. 2.Right ovary is unremarkable in appearance. 3.Small probable hemorrhagic cyst versus involuting corpus luteal cyst left ovary. 4.Normal Doppler flow within the ovaries bilaterally. Electronically Signed: Milo Dias MD  7/11/2025 3:39 PM EDT  Workstation ID: [id number]    US Testicular or Ovarian Vascular Limited  Result Date: 7/11/2025  US NON-OB TRANSVAGINAL, US TESTICULAR OR OVARIAN VASCULAR LIMITED Date of Exam: 7/11/2025 2:57 PM EDT Indication: RLQ pain, hx ovarian cyst. Comparison: CT same day Technique: Transvaginal pelvic ultrasound was performed.  Grayscale and color Doppler imaging was utilized to evaluate the pelvic area with image documentation per protocol.  Doppler spectral  analysis was performed. FINDINGS: Measurements: Patient is status post hysterectomy Right Ovary:  2.3 x 1.0 x 1.1 cm. Left Ovary: 3.2 x 2.0 x 1.8 cm. Ultrasound Findings: Patient is status post hysterectomy. Right Ovary: Right ovary is within normal limits.  There is normal arterial and venous Doppler flow. Left Ovary:  Simple appearing cyst, follicle noted measuring approximately 1.3 cm. Small complex structure measuring 1.8 1.5 x 1.5 cm representing involuting corpus luteal cyst or small hemorrhagic cyst. There is normal arterial and venous Doppler flow. Free Fluid: No evidence of free fluid.     Impression: 1.Status post hysterectomy.. 2.Right ovary is unremarkable in appearance. 3.Small probable hemorrhagic cyst versus involuting corpus luteal cyst left ovary. 4.Normal Doppler flow within the ovaries bilaterally. Electronically Signed: Milo Dias MD  7/11/2025 3:39 PM EDT  Workstation ID: [id number]    CT Abdomen Pelvis With Contrast  Result Date: 7/11/2025  CT ABDOMEN PELVIS W CONTRAST Date of Exam: 7/11/2025 2:34 PM EDT Indication: Right lower quadrant abdominal pain. Comparison: 9/12/2024. Technique: Axial CT images were obtained of the abdomen and pelvis following the uneventful intravenous administration of 100 cc of Isovue 300 contrast. Reconstructed coronal and sagittal images were also obtained. Automated exposure control and iterative construction methods were used. Findings: The appendix is within range of normal. There are diverticula mainly involving the descending colon and sigmoid colon with out evidence of wall thickening or pericolonic inflammatory change to indicate acute diverticulitis. There are no dilated loops of bowel to indicate an obstructive process. The gallbladder is surgically absent. The liver, adrenal glands, kidneys, pancreas, and spleen are normal. The uterus is surgically absent. The urinary bladder is normal. There are a few small follicles in the left ovary. There is a  trace amount of free pelvic cul-de-sac fluid which is felt to be physiologic. There is normal vascular enhancement. There are no enlarged lymph nodes. The lung bases are clear. There are no suspicious osteolytic or sclerotic lesions in the bony structures. There are underlying degenerative changes.     Impression: Impression: 1.No acute findings. 2.Colonic diverticulosis without evidence of acute diverticulitis. 3.The appendix is within range of normal. 4.Trace amount of free pelvic cul-de-sac fluid which is felt to be physiologic. Electronically Signed: Michael Prado MD  7/11/2025 3:07 PM EDT  Workstation ID: [id number]         [facility]     Amount and/or Complexity of Data Reviewed  Clinical lab tests: reviewed  Tests in the radiology section of CPT®: reviewed        Initial impression of presenting illness: Patient is a 50-year-old female presents emergency department for 3 days of worsening right lower quadrant pain.    Patient arrives afebrile, hematin stable, nontoxic-appearing with vitals interpreted by myself.     Pertinent features from physical exam: Right lower quadrant TTP with guarding.    DDX: includes but is not limited to: Appendicitis, ovarian torsion, diverticulitis, bowel obstruction, hernia, UTI, kidney stone, others    Initial diagnostic plan and interventions: Workup includes CBC, CMP, lipase, urinalysis, lactic acid, CT abdomen pelvis with contrast, transvaginal ultrasound.  Intervention clued IV fluids, morphine, Zofran.      Results from initial plan were reviewed and interpreted by me revealing no evidence of leukocytosis, significant anemia.  Lactic acid unremarkable.  Potassium is low at 3.2 patient given oral supplementation.  Will provide supplementation at home.  CT abdomen pelvis does not show acute findings.  Appendix within normal limits.  There is evidence of diverticulosis without diverticulitis.  Trace free fluid in pelvis likely physiologic.  Transvaginal ultrasound reveals good blood  flow to bilateral ovaries.  There is a hemorrhagic cyst on the left side.  Unremarkable right side.    Re-evaluation: On reevaluation patient continues remain hematin stable nontoxic-appearing.  Patient was concerned about mild bulging in right lower quadrant and palpitation is consistent with likely mild lymphadenopathy.  No skin discoloration or hernia.  No hernia seen on CT scan.  At this time symptoms could likely be explained from ruptured cyst on the right side as there is no other acute findings to describe pain.  Patient has had a ruptured cyst in the past and states symptoms are similar.  Patient is well-established with OB/GYN and will follow-up.  Patient instructed to follow-up with PCP.  Patient educated on monitoring symptoms for signs and symptoms worsening.  Patient given strict return precautions to the emergency department.      Medications   sodium chloride 0.9 % flush 10 mL (has no administration in time range)   ondansetron (ZOFRAN) injection 4 mg (4 mg Intravenous Given 7/11/25 1434)   sodium chloride 0.9 % bolus 1,000 mL (1,000 mL Intravenous New Bag 7/11/25 1530)   morphine injection 4 mg (4 mg Intravenous Given 7/11/25 1434)   iopamidol (ISOVUE-300) 61 % injection 100 mL (100 mL Intravenous Given 7/11/25 1456)   HYDROmorphone (DILAUDID) injection 0.5 mg (0.5 mg Intravenous Given 7/11/25 1534)   potassium chloride (MICRO-K/KLOR-CON) CR capsule (40 mEq Oral Given 7/11/25 1540)       Results/clinical rationale were discussed with patient        Data interpreted: Nursing notes reviewed, vital signs reviewed.  Labs independently interpreted by me.  Imaging independently interpreted by me.  EKG independently interpreted by me.     Counseling: Discussed the results above with the patient regarding need for admission or discharge.  Patient understands and agrees plan of care.      -----  ED Disposition       ED Disposition   Discharge    Condition   Stable    Comment   --             Final diagnoses:    Hemorrhagic ovarian cyst   Acute right lower quadrant pain     Your Follow-Up Providers    Follow-up information has not been specified.       Contact information for after-discharge care    Follow-up information has not been specified.          Your medication list        CONTINUE taking these medications        Instructions Last Dose Given Next Dose Due   albuterol sulfate  (90 Base) MCG/ACT inhaler  Commonly known as: PROVENTIL HFA;VENTOLIN HFA;PROAIR HFA      INHALE 2 PUFFS EVERY 4 HOURS AS NEEDED FOR WHEEZING       Allergy Relief 180 MG tablet  Generic drug: fexofenadine      Take 1 tablet by mouth Daily.       brompheniramine-pseudoephedrine-DM 30-2-10 MG/5ML syrup      Take 5 mL by mouth 4 (Four) Times a Day As Needed for Congestion or Cough.       buPROPion  MG 24 hr tablet  Commonly known as: WELLBUTRIN XL      TAKE 1 TABLET BY MOUTH DAILY       dicyclomine 20 MG tablet  Commonly known as: BENTYL      Take 1 tablet by mouth As Needed.       EPINEPHrine 0.3 MG/0.3ML solution auto-injector injection  Commonly known as: EPIPEN      Inject 0.3 mL into the appropriate muscle as directed by prescriber 1 (One) Time As Needed (severe allergic reaction).       estradiol 0.0375 MG/24HR patch  Commonly known as: VIVELLE-DOT      Place 1 patch on the skin as directed by provider 2 (Two) Times a Week.       famotidine 20 MG tablet  Commonly known as: PEPCID      Take 1 tablet by mouth 2 (Two) Times a Day As Needed.       fluconazole 150 MG tablet  Commonly known as: DIFLUCAN           hydroCHLOROthiazide 25 MG tablet      TAKE 1 TABLET BY MOUTH DAILY       hydrOXYzine 10 MG tablet  Commonly known as: ATARAX      Take 1 tablet by mouth 2 (Two) Times a Day As Needed for Anxiety.       metoprolol tartrate 25 MG tablet  Commonly known as: LOPRESSOR      TAKE 1 TABLET BY MOUTH TWICE DAILY       Naltrexone powder      Use 1 Units Daily. Naltrexone 100% powder Compounded 2mg low dose naltrexone compounded  lactose free 2mg, 1 po qd Dispense quantity 30 gram with 3 refills       ondansetron 4 MG tablet  Commonly known as: ZOFRAN           ondansetron ODT 4 MG disintegrating tablet  Commonly known as: ZOFRAN-ODT      Place 1 tablet on the tongue Every 8 (Eight) Hours As Needed for Nausea or Vomiting.       pimecrolimus 1 % cream  Commonly known as: Elidel      Apply 1 Application topically to the appropriate area as directed 2 (Two) Times a Day.       promethazine 25 MG tablet  Commonly known as: PHENERGAN      Take 1 tablet by mouth Every 6 (Six) Hours As Needed for Nausea or Vomiting.       Qulipta 60 MG tablet  Generic drug: Atogepant      Take 1 tablet by mouth Daily.       Semaglutide-Weight Management 0.25 MG/0.5ML solution auto-injector      Inject 0.5 mL under the skin into the appropriate area as directed Every 7 (Seven) Days.       traZODone 50 MG tablet  Commonly known as: DESYREL      Take 1-2 tablets by mouth every night at bedtime.       Ubrelvy 100 MG tablet  Generic drug: ubrogepant      Take 1 tablet by mouth 2 (Two) Times a Day As Needed.

## 2025-07-11 NOTE — DISCHARGE INSTRUCTIONS
Follow-up with PCP and OB for recheck of symptoms.  Return the emergency department for new, worsening, concerning symptoms.

## 2025-07-15 ENCOUNTER — TELEPHONE (OUTPATIENT)
Dept: INTERNAL MEDICINE | Facility: CLINIC | Age: 50
End: 2025-07-15

## 2025-07-15 DIAGNOSIS — F41.1 GAD (GENERALIZED ANXIETY DISORDER): ICD-10-CM

## 2025-07-15 RX ORDER — BUPROPION HYDROCHLORIDE 300 MG/1
300 TABLET ORAL DAILY
Qty: 90 TABLET | Refills: 0 | Status: SHIPPED | OUTPATIENT
Start: 2025-07-15

## 2025-07-15 NOTE — TELEPHONE ENCOUNTER
Rx Refill Note  Requested Prescriptions     Pending Prescriptions Disp Refills    buPROPion XL (WELLBUTRIN XL) 300 MG 24 hr tablet 90 tablet 0     Sig: Take 1 tablet by mouth Daily.      Last office visit with prescribing clinician: 6/4/2025   Last telemedicine visit with prescribing clinician: 2/26/2025   Next office visit with prescribing clinician: 09/08/2025      Stefany Tolbert  07/15/25, 14:14 EDT

## 2025-07-15 NOTE — TELEPHONE ENCOUNTER
Caller: [facility] - [facility]EL[facility] Guardado - [facility] AT Portal to Registered [facility] - [phone] PH - [phone] FX    Relationship: Pharmacy    Best call back number: [phone]     Requested Prescriptions:   Requested Prescriptions     Pending Prescriptions Disp Refills    buPROPion XL (WELLBUTRIN XL) 300 MG 24 hr tablet 90 tablet 0     Sig: Take 1 tablet by mouth Daily.        Pharmacy where request should be sent: [facility]EL[facility] GUARDADO - [address] [facility] AT PORTAL TO REGISTERED [facility] - [phone]  - [phone] FX     Last office visit with prescribing clinician: 6/4/2025   Last telemedicine visit with prescribing clinician: 2/26/2025   Next office visit with prescribing clinician: 9/8/2025     Additional details provided by patient: PATIENT HAS 2 LEFT NO GENERIC    Does the patient have less than a 3 day supply:  [x] Yes  [] No    Would you like a call back once the refill request has been completed: [] Yes [x] No    If the office needs to give you a call back, can they leave a voicemail: [] Yes [x] No    Shan Blackman Rep   07/15/25 13:34 EDT

## 2025-08-11 DIAGNOSIS — F41.1 GAD (GENERALIZED ANXIETY DISORDER): ICD-10-CM

## 2025-08-12 RX ORDER — BUPROPION HYDROCHLORIDE 300 MG/1
300 TABLET ORAL DAILY
Qty: 90 TABLET | Refills: 0 | OUTPATIENT
Start: 2025-08-12

## 2025-08-22 ENCOUNTER — LAB (OUTPATIENT)
Dept: INTERNAL MEDICINE | Facility: CLINIC | Age: 50
End: 2025-08-22
Payer: COMMERCIAL

## 2025-08-22 DIAGNOSIS — R79.82 CRP ELEVATED: ICD-10-CM

## 2025-08-22 LAB
CHROMATIN AB SERPL-ACNC: <10 IU/ML (ref 0–14)
DEPRECATED RDW RBC AUTO: 39.6 FL (ref 37–54)
ERYTHROCYTE [DISTWIDTH] IN BLOOD BY AUTOMATED COUNT: 11.7 % (ref 12.3–15.4)
HCT VFR BLD AUTO: 36.9 % (ref 34–46.6)
HGB BLD-MCNC: 12.3 G/DL (ref 12–15.9)
MCH RBC QN AUTO: 30.5 PG (ref 26.6–33)
MCHC RBC AUTO-ENTMCNC: 33.3 G/DL (ref 31.5–35.7)
MCV RBC AUTO: 91.6 FL (ref 79–97)
PLATELET # BLD AUTO: 326 10*3/MM3 (ref 140–450)
PMV BLD AUTO: 10.6 FL (ref 6–12)
RBC # BLD AUTO: 4.03 10*6/MM3 (ref 3.77–5.28)
URATE SERPL-MCNC: 3 MG/DL (ref 2.4–5.7)
WBC NRBC COR # BLD AUTO: 6.87 10*3/MM3 (ref 3.4–10.8)

## 2025-08-22 PROCEDURE — 36415 COLL VENOUS BLD VENIPUNCTURE: CPT | Performed by: NURSE PRACTITIONER

## 2025-08-25 LAB
ANA SER QL: NEGATIVE
CCP IGA+IGG SERPL IA-ACNC: 4 UNITS (ref 0–19)

## 2025-08-27 ENCOUNTER — OFFICE VISIT (OUTPATIENT)
Dept: INTERNAL MEDICINE | Facility: CLINIC | Age: 50
End: 2025-08-27
Payer: COMMERCIAL

## 2025-08-27 VITALS
HEART RATE: 64 BPM | RESPIRATION RATE: 14 BRPM | TEMPERATURE: 97.8 F | DIASTOLIC BLOOD PRESSURE: 74 MMHG | OXYGEN SATURATION: 100 % | WEIGHT: 159 LBS | HEIGHT: 62 IN | BODY MASS INDEX: 29.26 KG/M2 | SYSTOLIC BLOOD PRESSURE: 108 MMHG

## 2025-08-27 DIAGNOSIS — J18.9 PNEUMONIA OF RIGHT LOWER LOBE DUE TO INFECTIOUS ORGANISM: Primary | ICD-10-CM

## 2025-08-27 PROCEDURE — 99214 OFFICE O/P EST MOD 30 MIN: CPT | Performed by: NURSE PRACTITIONER

## 2025-08-27 RX ORDER — DOXYCYCLINE 100 MG/1
100 CAPSULE ORAL 2 TIMES DAILY
Qty: 20 CAPSULE | Refills: 0 | Status: SHIPPED | OUTPATIENT
Start: 2025-08-27 | End: 2025-09-06

## 2025-08-27 RX ORDER — FLUCONAZOLE 150 MG/1
150 TABLET ORAL ONCE
Qty: 2 TABLET | Refills: 0 | Status: SHIPPED | OUTPATIENT
Start: 2025-08-27 | End: 2025-08-27

## 2025-08-29 RX ORDER — FEXOFENADINE HCL 180 MG/1
180 TABLET ORAL DAILY
Qty: 30 TABLET | Refills: 1 | Status: SHIPPED | OUTPATIENT
Start: 2025-08-29